# Patient Record
Sex: FEMALE | Race: WHITE | NOT HISPANIC OR LATINO | Employment: OTHER | URBAN - METROPOLITAN AREA
[De-identification: names, ages, dates, MRNs, and addresses within clinical notes are randomized per-mention and may not be internally consistent; named-entity substitution may affect disease eponyms.]

---

## 2017-06-19 ENCOUNTER — TELEPHONE (OUTPATIENT)
Dept: UROLOGY | Facility: CLINIC | Age: 77
End: 2017-06-19

## 2017-06-19 DIAGNOSIS — N20.0 KIDNEY STONE: Primary | ICD-10-CM

## 2017-06-19 NOTE — TELEPHONE ENCOUNTER
----- Message from Benita Griffin sent at 6/19/2017  8:47 AM CDT -----  Contact: ILENE Dove passed a kidney stone over the weekend. She is calling to find out if she needs an appointment or does the stone need to be brought in. States she will be leaving her home about 10:30 am. Please call [phone] (home)   thanks

## 2017-06-19 NOTE — TELEPHONE ENCOUNTER
Spoke to pt an she passed her kidney stone and was able to save it. She is going to bring it into the lab today for analysis.

## 2017-06-27 ENCOUNTER — LAB VISIT (OUTPATIENT)
Dept: LAB | Facility: HOSPITAL | Age: 77
End: 2017-06-27
Attending: UROLOGY
Payer: MEDICARE

## 2017-06-27 DIAGNOSIS — N20.0 KIDNEY STONE: ICD-10-CM

## 2017-06-27 PROCEDURE — 82365 CALCULUS SPECTROSCOPY: CPT

## 2017-07-03 LAB
ANNOTATION COMMENT IMP: NORMAL
COMPN STONE: NORMAL
SPECIMEN SOURCE: NORMAL
STONE ANALYSIS IR-IMP: NORMAL

## 2018-07-19 ENCOUNTER — OFFICE VISIT (OUTPATIENT)
Dept: UROLOGY | Facility: CLINIC | Age: 78
End: 2018-07-19
Payer: MEDICARE

## 2018-07-19 VITALS
DIASTOLIC BLOOD PRESSURE: 71 MMHG | WEIGHT: 155.13 LBS | HEIGHT: 61 IN | HEART RATE: 74 BPM | SYSTOLIC BLOOD PRESSURE: 138 MMHG | BODY MASS INDEX: 29.29 KG/M2

## 2018-07-19 DIAGNOSIS — N20.0 KIDNEY STONE: Primary | ICD-10-CM

## 2018-07-19 LAB
BILIRUB SERPL-MCNC: NORMAL MG/DL
BLOOD URINE, POC: NORMAL
COLOR, POC UA: YELLOW
GLUCOSE UR QL STRIP: NORMAL
KETONES UR QL STRIP: NORMAL
LEUKOCYTE ESTERASE URINE, POC: NORMAL
NITRITE, POC UA: NORMAL
PH, POC UA: 6.5
PROTEIN, POC: NORMAL
SPECIFIC GRAVITY, POC UA: 1.02
UROBILINOGEN, POC UA: NORMAL

## 2018-07-19 PROCEDURE — 99214 OFFICE O/P EST MOD 30 MIN: CPT | Mod: 25,S$GLB,, | Performed by: UROLOGY

## 2018-07-19 PROCEDURE — 99999 PR PBB SHADOW E&M-EST. PATIENT-LVL III: CPT | Mod: PBBFAC,,, | Performed by: UROLOGY

## 2018-07-19 PROCEDURE — 81002 URINALYSIS NONAUTO W/O SCOPE: CPT | Mod: S$GLB,,, | Performed by: UROLOGY

## 2018-07-19 RX ORDER — ROSUVASTATIN CALCIUM 5 MG/1
TABLET, COATED ORAL
COMMUNITY
Start: 2018-07-06 | End: 2019-09-10 | Stop reason: SDUPTHER

## 2018-07-19 NOTE — PROGRESS NOTES
UROLOGY [place]  7 19 18    Cc nephrolithiasis    Age 77 has a hx of nephrolithiasis, and has significant concern regarding this history.     Two years ago presented at [facility] with acute colic and a CT scan suggested that she might just have passed a stone.  At the current time, patient is asymptomatic and voiding without difficulty and denies pain. She would like a CT scan follow up to make sure she does not have worsening stone situation     PAST MEDICAL HISTORY:    SURGICAL:  Cholecystectomy,  section, cataract surgery.     MEDICAL:  CAD, essential hypertension, elevated cholesterol, obesity, osteoarthritis.     FAMILIAL:  Father and brother with nephrolithiasis.     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     ALLERGIES:  NKDA.    Current Outpatient Prescriptions on File Prior to Visit   Medication Sig Dispense Refill    aspirin 325 MG tablet Take 325 mg by mouth once.       fish,bora,flax oils-om3,6,9 #1 1,200 mg Cap Take by mouth. 3 capsules daily         REVIEW OF SYSTEMS  GENERAL:  No headaches or dizzy spells.   HEENT: vision preserved. Sinuses: No complaints.   CARDIOPULMONARY: No swelling of the legs; no chest pain. No shortness of breath, no wheezing.   GASTROINTESTINAL: No heartburn. Denies diarrhea; denies constipation, no blood or mucus in stools.   GENITOURINARY: Denies dysuria, bleeding or incontinence.   MUSCULOSKELETAL: some arthritic complaints such as pain or stiffness.   PSYCHIATRIC: No history of depression or anxiety.   ENDOCRINOLOGIC: No reports of sweating, cold or heat intolerance. No polyuria or polydipsia.   NEUROLOGICAL: No headache, dizziness, syncope, paralysis  LYMPHATICS: No enlarged nodes. No history of splenectomy.  ==       PHYSICAL EXAMINATION:  ABDOMEN:  Flat.  No masses.  CVAs negative.  No organomegaly or tenderness.     IMPRESSION:  Nephrolithiasis, with history of  spontaneous passage of stones.  Ordering a CT scan, wants it done at [facility]

## 2018-07-23 ENCOUNTER — TELEPHONE (OUTPATIENT)
Dept: UROLOGY | Facility: CLINIC | Age: 78
End: 2018-07-23

## 2018-07-23 NOTE — TELEPHONE ENCOUNTER
----- Message from Venus Randolph sent at 7/23/2018 11:41 AM CDT -----  Contact: [facility]  Lani is requesting an authorization from the patients insurance for the CT Scan that is scheduled for tomorrow 7/24.  Call Lani back at [phone].  Thanks

## 2018-07-24 ENCOUNTER — TELEPHONE (OUTPATIENT)
Dept: UROLOGY | Facility: CLINIC | Age: 78
End: 2018-07-24

## 2018-07-24 NOTE — TELEPHONE ENCOUNTER
----- Message from Jeovany Nolasco sent at 7/24/2018  2:35 PM CDT -----  Contact: self   Patient need CT results, please call back at [phone] (home)

## 2018-07-25 NOTE — TELEPHONE ENCOUNTER
Patient notified that CT Scan done @ [facility] shows right side nephrolithiasis without hydronephrosis.

## 2019-09-05 ENCOUNTER — TELEPHONE (OUTPATIENT)
Dept: UROLOGY | Facility: CLINIC | Age: 79
End: 2019-09-05

## 2019-09-05 DIAGNOSIS — N20.0 KIDNEY STONE: Primary | ICD-10-CM

## 2019-09-05 NOTE — TELEPHONE ENCOUNTER
----- Message from Yumiko Keller sent at 9/5/2019  8:08 AM CDT -----  Type: Needs Medical Advice    Who Called:  Patient  Best Call Back Number: [phone]  Additional Information: Patient stated she passed kidney stones last night (has them to bring in)requesting to speak with nurse concerning/please call back to advise.

## 2019-09-10 ENCOUNTER — HOSPITAL ENCOUNTER (OUTPATIENT)
Dept: RADIOLOGY | Facility: HOSPITAL | Age: 79
Discharge: HOME | End: 2019-09-10
Attending: UROLOGY
Payer: MEDICARE

## 2019-09-10 ENCOUNTER — OFFICE VISIT (OUTPATIENT)
Dept: UROLOGY | Facility: CLINIC | Age: 79
End: 2019-09-10
Payer: MEDICARE

## 2019-09-10 VITALS
WEIGHT: 162.69 LBS | HEART RATE: 77 BPM | DIASTOLIC BLOOD PRESSURE: 67 MMHG | BODY MASS INDEX: 30.71 KG/M2 | SYSTOLIC BLOOD PRESSURE: 123 MMHG | HEIGHT: 61 IN

## 2019-09-10 DIAGNOSIS — N23 RENAL COLIC ON LEFT SIDE: ICD-10-CM

## 2019-09-10 DIAGNOSIS — N20.0 KIDNEY STONE: Primary | ICD-10-CM

## 2019-09-10 LAB
BILIRUB SERPL-MCNC: NORMAL MG/DL
BLOOD URINE, POC: NORMAL
COLOR, POC UA: NORMAL
GLUCOSE UR QL STRIP: NORMAL
KETONES UR QL STRIP: NORMAL
LEUKOCYTE ESTERASE URINE, POC: NORMAL
NITRITE, POC UA: NORMAL
PH, POC UA: 5.5
PROTEIN, POC: NORMAL
SPECIFIC GRAVITY, POC UA: 1025
UROBILINOGEN, POC UA: NORMAL

## 2019-09-10 PROCEDURE — 1101F PR PT FALLS ASSESS DOC 0-1 FALLS W/OUT INJ PAST YR: ICD-10-PCS | Mod: CPTII,S$GLB,, | Performed by: UROLOGY

## 2019-09-10 PROCEDURE — 74176 CT RENAL STONE STUDY ABD PELVIS WO: ICD-10-PCS | Mod: 26,,, | Performed by: RADIOLOGY

## 2019-09-10 PROCEDURE — 1101F PT FALLS ASSESS-DOCD LE1/YR: CPT | Mod: CPTII,S$GLB,, | Performed by: UROLOGY

## 2019-09-10 PROCEDURE — 99999 PR PBB SHADOW E&M-EST. PATIENT-LVL III: CPT | Mod: PBBFAC,,, | Performed by: UROLOGY

## 2019-09-10 PROCEDURE — 81002 POCT URINE DIPSTICK WITHOUT MICROSCOPE: ICD-10-PCS | Mod: S$GLB,,, | Performed by: UROLOGY

## 2019-09-10 PROCEDURE — 99214 OFFICE O/P EST MOD 30 MIN: CPT | Mod: 25,S$GLB,, | Performed by: UROLOGY

## 2019-09-10 PROCEDURE — 81002 URINALYSIS NONAUTO W/O SCOPE: CPT | Mod: S$GLB,,, | Performed by: UROLOGY

## 2019-09-10 PROCEDURE — 74176 CT ABD & PELVIS W/O CONTRAST: CPT | Mod: TC,PO

## 2019-09-10 PROCEDURE — 99999 PR PBB SHADOW E&M-EST. PATIENT-LVL III: ICD-10-PCS | Mod: PBBFAC,,, | Performed by: UROLOGY

## 2019-09-10 PROCEDURE — 74176 CT ABD & PELVIS W/O CONTRAST: CPT | Mod: 26,,, | Performed by: RADIOLOGY

## 2019-09-10 PROCEDURE — 99214 PR OFFICE/OUTPT VISIT, EST, LEVL IV, 30-39 MIN: ICD-10-PCS | Mod: 25,S$GLB,, | Performed by: UROLOGY

## 2019-09-10 RX ORDER — ROSUVASTATIN CALCIUM 5 MG/1
TABLET, COATED ORAL
COMMUNITY
End: 2021-05-24 | Stop reason: SDUPTHER

## 2019-09-10 RX ORDER — ATORVASTATIN CALCIUM 20 MG/1
20 TABLET, FILM COATED ORAL
COMMUNITY
End: 2021-05-24

## 2019-09-10 NOTE — PROGRESS NOTES
UROLOGY [place]  9 10 19    Cc nephrolithiasis    Age 79, this past week she presented acute L flank pain and nausea, and ended up passing two small stones (2-3 mm each). She brings the stones to the interview. She says she has had 4 stones in the past, and has passed them all.     She would like to have a CT scan to see where she stands in her kidney stone disease and would like to be proactive about her stones.     Feeling well now, and voiding well.        PAST MEDICAL HISTORY:     SURGICAL:  Cholecystectomy,  section, cataract surgery.     MEDICAL:  CAD, essential hypertension, elevated cholesterol, obesity, osteoarthritis. nephrolithiasis     FAMILIAL:  Father and brother with nephrolithiasis.     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     ALLERGIES:  NKDA.            Current Outpatient Prescriptions on File Prior to Visit   Medication Sig Dispense Refill    aspirin 325 MG tablet Take 325 mg by mouth once.         fish,bora,flax oils-om3,6,9 #1 1,200 mg Cap Take by mouth. 3 capsules daily             REVIEW OF SYSTEMS  GENERAL:  No headaches or dizzy spells.   HEENT: vision preserved. Sinuses: No complaints.   CARDIOPULMONARY: No swelling of the legs; no chest pain. No shortness of breath.   GASTROINTESTINAL: No heartburn. Denies diarrhea; denies constipation, no blood or mucus in stools.   GENITOURINARY: passing kidney stones. Denies dysuria, bleeding.   MUSCULOSKELETAL: some arthritic complaints such as pain or stiffness.   PSYCHIATRIC: No history of depression or anxiety.   ENDOCRINOLOGIC: No reports of sweating, cold or heat intolerance. No polyuria or polydipsia.   NEUROLOGICAL: No headache, dizziness, syncope, paralysis  LYMPHATICS: No enlarged nodes. No history of splenectomy.  ==        PHYSICAL EXAMINATION:    Pt alert, oriented, no distress  HEENT: wnl  Neck: supple, no JVD, no lymphadenopathy  Chest: CV  NSR  Lungs: normal chest expansion  Abdomen flat, nontender, no organomegaly, no masses.  No hernias  Extremities: no edema, peripheral pulses nl  Neuro: preserved      IMPRESSION:    Nephrolithiasis, with history of spontaneous passage of stones x 6.  Ordering a CT stone protocol

## 2019-09-17 ENCOUNTER — TELEPHONE (OUTPATIENT)
Dept: UROLOGY | Facility: CLINIC | Age: 79
End: 2019-09-17

## 2019-09-17 NOTE — TELEPHONE ENCOUNTER
Discussed findings and recommendations with the patient. She stated whatever test the doctor recommends, she would take. Please order whatever test you would like for her and I will call he rto schedule.

## 2019-09-20 ENCOUNTER — TELEPHONE (OUTPATIENT)
Dept: UROLOGY | Facility: CLINIC | Age: 79
End: 2019-09-20

## 2019-09-20 NOTE — TELEPHONE ENCOUNTER
----- Message from Irina Dye sent at 9/20/2019 11:56 AM CDT -----  Contact: patient  Type: Needs Medical Advice    Who Called:  patient  Best Call Back Number: [phone]  Additional Information: patient calling to schedule other tests that were recommended for her to take. Please give call back

## 2019-09-25 ENCOUNTER — TELEPHONE (OUTPATIENT)
Dept: UROLOGY | Facility: CLINIC | Age: 79
End: 2019-09-25

## 2019-09-25 DIAGNOSIS — N94.89 ADNEXAL MASS: ICD-10-CM

## 2019-09-25 NOTE — TELEPHONE ENCOUNTER
----- Message from Juan Daniel Hughes sent at 9/25/2019  2:39 PM CDT -----  Type: Needs Medical Advice    Who Called:  Patient    Best Call Back Number: [phone]  Additional Information: Patient states that she would like a callback regarding a contrast test on her kidneys

## 2019-09-26 ENCOUNTER — LAB VISIT (OUTPATIENT)
Dept: LAB | Facility: HOSPITAL | Age: 79
End: 2019-09-26
Attending: UROLOGY
Payer: MEDICARE

## 2019-09-26 DIAGNOSIS — N94.89 ADNEXAL MASS: ICD-10-CM

## 2019-09-26 LAB
CREAT SERPL-MCNC: 0.9 MG/DL (ref 0.5–1.4)
EST. GFR  (AFRICAN AMERICAN): >60 ML/MIN/1.73 M^2
EST. GFR  (NON AFRICAN AMERICAN): >60 ML/MIN/1.73 M^2

## 2019-09-26 PROCEDURE — 82565 ASSAY OF CREATININE: CPT

## 2019-09-26 PROCEDURE — 36415 COLL VENOUS BLD VENIPUNCTURE: CPT | Mod: PO

## 2019-10-03 ENCOUNTER — HOSPITAL ENCOUNTER (OUTPATIENT)
Dept: RADIOLOGY | Facility: HOSPITAL | Age: 79
Discharge: HOME | End: 2019-10-03
Attending: UROLOGY
Payer: MEDICARE

## 2019-10-03 DIAGNOSIS — N94.89 ADNEXAL MASS: ICD-10-CM

## 2019-10-03 PROCEDURE — 74170 CT ABDOMEN W WO CONTRAST: ICD-10-PCS | Mod: 26,,, | Performed by: RADIOLOGY

## 2019-10-03 PROCEDURE — 74170 CT ABD WO CNTRST FLWD CNTRST: CPT | Mod: TC,PO

## 2019-10-03 PROCEDURE — 74170 CT ABD WO CNTRST FLWD CNTRST: CPT | Mod: 26,,, | Performed by: RADIOLOGY

## 2019-10-03 PROCEDURE — 25500020 PHARM REV CODE 255: Mod: PO | Performed by: UROLOGY

## 2019-10-03 RX ADMIN — IOHEXOL 75 ML: 350 INJECTION, SOLUTION INTRAVENOUS at 08:10

## 2019-10-04 ENCOUNTER — TELEPHONE (OUTPATIENT)
Dept: UROLOGY | Facility: CLINIC | Age: 79
End: 2019-10-04

## 2019-10-04 NOTE — TELEPHONE ENCOUNTER
----- Message from Pedro Huertas sent at 10/4/2019 12:07 PM CDT -----  Contact: Patient  Type:  Patient Returning Call    Who Called:  Patient  Who Left Message for Patient:  unknown  Does the patient know what this is regarding?:  Test results  Best Call Back Number:  [phone]  Additional Information:  NA

## 2019-10-09 ENCOUNTER — TELEPHONE (OUTPATIENT)
Dept: UROLOGY | Facility: CLINIC | Age: 79
End: 2019-10-09

## 2019-10-09 NOTE — TELEPHONE ENCOUNTER
----- Message from Jazzy Conner sent at 10/9/2019  8:04 AM CDT -----  Contact: self  Type:  Test Results    Who Called:  Patient   Name of Test (Lab/Mammo/Etc):  CT  Date of Test:  10/03  Ordering Provider:  Vijay  Where the test was performed:  [address] [facility] [address] [place]  Best Call Back Number:  [phone] (home) patient can be reach this afternoon per patient     Additional Information:

## 2019-10-10 NOTE — TELEPHONE ENCOUNTER
She has a small non obstructing kidney stone. No cause for pain and no future treatment required.

## 2019-10-17 LAB — BMD RECOMMENDATION EXT: NORMAL

## 2021-01-10 ENCOUNTER — IMMUNIZATION (OUTPATIENT)
Dept: FAMILY MEDICINE | Facility: CLINIC | Age: 81
End: 2021-01-10
Payer: MEDICARE

## 2021-01-10 DIAGNOSIS — Z23 NEED FOR VACCINATION: ICD-10-CM

## 2021-01-10 PROCEDURE — 91300 COVID-19, MRNA, LNP-S, PF, 30 MCG/0.3 ML DOSE VACCINE: CPT | Mod: PBBFAC | Performed by: FAMILY MEDICINE

## 2021-01-31 ENCOUNTER — IMMUNIZATION (OUTPATIENT)
Dept: FAMILY MEDICINE | Facility: CLINIC | Age: 81
End: 2021-01-31
Payer: MEDICARE

## 2021-01-31 DIAGNOSIS — Z23 NEED FOR VACCINATION: Primary | ICD-10-CM

## 2021-01-31 PROCEDURE — 0002A COVID-19, MRNA, LNP-S, PF, 30 MCG/0.3 ML DOSE VACCINE: CPT | Mod: PBBFAC | Performed by: INTERNAL MEDICINE

## 2021-01-31 PROCEDURE — 91300 COVID-19, MRNA, LNP-S, PF, 30 MCG/0.3 ML DOSE VACCINE: CPT | Mod: PBBFAC | Performed by: INTERNAL MEDICINE

## 2021-03-02 DIAGNOSIS — Z96.651 STATUS POST TOTAL KNEE REPLACEMENT, RIGHT: Primary | ICD-10-CM

## 2021-03-05 ENCOUNTER — CLINICAL SUPPORT (OUTPATIENT)
Dept: REHABILITATION | Facility: HOSPITAL | Age: 81
End: 2021-03-05
Payer: MEDICARE

## 2021-03-05 DIAGNOSIS — Z96.651 STATUS POST TOTAL KNEE REPLACEMENT, RIGHT: ICD-10-CM

## 2021-03-05 DIAGNOSIS — M25.661 DECREASED ROM OF RIGHT KNEE: ICD-10-CM

## 2021-03-05 PROCEDURE — 97161 PT EVAL LOW COMPLEX 20 MIN: CPT | Mod: PO

## 2021-03-05 PROCEDURE — 97110 THERAPEUTIC EXERCISES: CPT | Mod: PO

## 2021-03-10 ENCOUNTER — CLINICAL SUPPORT (OUTPATIENT)
Dept: REHABILITATION | Facility: HOSPITAL | Age: 81
End: 2021-03-10
Payer: MEDICARE

## 2021-03-10 DIAGNOSIS — M25.661 DECREASED ROM OF RIGHT KNEE: ICD-10-CM

## 2021-03-10 PROCEDURE — 97110 THERAPEUTIC EXERCISES: CPT | Mod: PO,CQ

## 2021-03-10 PROCEDURE — 97140 MANUAL THERAPY 1/> REGIONS: CPT | Mod: PO,CQ

## 2021-03-16 ENCOUNTER — CLINICAL SUPPORT (OUTPATIENT)
Dept: REHABILITATION | Facility: HOSPITAL | Age: 81
End: 2021-03-16
Payer: MEDICARE

## 2021-03-16 DIAGNOSIS — M25.661 DECREASED ROM OF RIGHT KNEE: ICD-10-CM

## 2021-03-16 PROCEDURE — 97110 THERAPEUTIC EXERCISES: CPT | Mod: PO,CQ

## 2021-03-19 ENCOUNTER — CLINICAL SUPPORT (OUTPATIENT)
Dept: REHABILITATION | Facility: HOSPITAL | Age: 81
End: 2021-03-19
Payer: MEDICARE

## 2021-03-19 DIAGNOSIS — M25.661 DECREASED ROM OF RIGHT KNEE: ICD-10-CM

## 2021-03-19 PROCEDURE — 97110 THERAPEUTIC EXERCISES: CPT | Mod: PO,CQ

## 2021-03-23 ENCOUNTER — CLINICAL SUPPORT (OUTPATIENT)
Dept: REHABILITATION | Facility: HOSPITAL | Age: 81
End: 2021-03-23
Payer: MEDICARE

## 2021-03-23 DIAGNOSIS — M25.661 DECREASED ROM OF RIGHT KNEE: ICD-10-CM

## 2021-03-23 PROCEDURE — 97110 THERAPEUTIC EXERCISES: CPT | Mod: PO

## 2021-03-26 ENCOUNTER — CLINICAL SUPPORT (OUTPATIENT)
Dept: REHABILITATION | Facility: HOSPITAL | Age: 81
End: 2021-03-26
Payer: MEDICARE

## 2021-03-26 DIAGNOSIS — M25.661 DECREASED ROM OF RIGHT KNEE: ICD-10-CM

## 2021-03-26 PROCEDURE — 97110 THERAPEUTIC EXERCISES: CPT | Mod: PO

## 2021-03-30 ENCOUNTER — CLINICAL SUPPORT (OUTPATIENT)
Dept: REHABILITATION | Facility: HOSPITAL | Age: 81
End: 2021-03-30
Payer: MEDICARE

## 2021-03-30 DIAGNOSIS — M25.661 DECREASED ROM OF RIGHT KNEE: ICD-10-CM

## 2021-03-30 PROCEDURE — 97110 THERAPEUTIC EXERCISES: CPT | Mod: PO

## 2021-04-01 ENCOUNTER — CLINICAL SUPPORT (OUTPATIENT)
Dept: REHABILITATION | Facility: HOSPITAL | Age: 81
End: 2021-04-01
Payer: MEDICARE

## 2021-04-01 DIAGNOSIS — M25.661 DECREASED ROM OF RIGHT KNEE: ICD-10-CM

## 2021-04-01 PROCEDURE — 97110 THERAPEUTIC EXERCISES: CPT | Mod: PO,CQ

## 2021-04-06 ENCOUNTER — CLINICAL SUPPORT (OUTPATIENT)
Dept: REHABILITATION | Facility: HOSPITAL | Age: 81
End: 2021-04-06
Payer: MEDICARE

## 2021-04-06 DIAGNOSIS — M25.661 DECREASED ROM OF RIGHT KNEE: ICD-10-CM

## 2021-04-06 PROCEDURE — 97110 THERAPEUTIC EXERCISES: CPT | Mod: PO,CQ

## 2021-04-09 ENCOUNTER — CLINICAL SUPPORT (OUTPATIENT)
Dept: REHABILITATION | Facility: HOSPITAL | Age: 81
End: 2021-04-09
Payer: MEDICARE

## 2021-04-09 DIAGNOSIS — M25.661 DECREASED ROM OF RIGHT KNEE: ICD-10-CM

## 2021-04-09 PROCEDURE — 97110 THERAPEUTIC EXERCISES: CPT | Mod: PO

## 2021-04-13 ENCOUNTER — CLINICAL SUPPORT (OUTPATIENT)
Dept: REHABILITATION | Facility: HOSPITAL | Age: 81
End: 2021-04-13
Payer: MEDICARE

## 2021-04-13 DIAGNOSIS — M25.661 DECREASED ROM OF RIGHT KNEE: ICD-10-CM

## 2021-04-13 PROCEDURE — 97110 THERAPEUTIC EXERCISES: CPT | Mod: PO

## 2021-04-14 DIAGNOSIS — Z96.651 STATUS POST TOTAL RIGHT KNEE REPLACEMENT: Primary | ICD-10-CM

## 2021-04-16 ENCOUNTER — CLINICAL SUPPORT (OUTPATIENT)
Dept: REHABILITATION | Facility: HOSPITAL | Age: 81
End: 2021-04-16
Payer: MEDICARE

## 2021-04-16 DIAGNOSIS — M25.661 DECREASED ROM OF RIGHT KNEE: ICD-10-CM

## 2021-04-16 PROCEDURE — 97110 THERAPEUTIC EXERCISES: CPT | Mod: PO

## 2021-05-05 ENCOUNTER — TELEPHONE (OUTPATIENT)
Dept: OBSTETRICS AND GYNECOLOGY | Facility: CLINIC | Age: 81
End: 2021-05-05

## 2021-05-05 ENCOUNTER — TELEPHONE (OUTPATIENT)
Dept: FAMILY MEDICINE | Facility: CLINIC | Age: 81
End: 2021-05-05

## 2021-05-24 ENCOUNTER — OFFICE VISIT (OUTPATIENT)
Dept: FAMILY MEDICINE | Facility: CLINIC | Age: 81
End: 2021-05-24
Payer: MEDICARE

## 2021-05-24 VITALS
HEART RATE: 70 BPM | BODY MASS INDEX: 32.48 KG/M2 | HEIGHT: 62 IN | SYSTOLIC BLOOD PRESSURE: 138 MMHG | DIASTOLIC BLOOD PRESSURE: 80 MMHG | WEIGHT: 176.5 LBS

## 2021-05-24 DIAGNOSIS — N28.1 KIDNEY CYSTS: ICD-10-CM

## 2021-05-24 DIAGNOSIS — Z00.00 MEDICARE ANNUAL WELLNESS VISIT, SUBSEQUENT: ICD-10-CM

## 2021-05-24 DIAGNOSIS — I70.0 AORTIC ATHEROSCLEROSIS: ICD-10-CM

## 2021-05-24 DIAGNOSIS — M51.36 DDD (DEGENERATIVE DISC DISEASE), LUMBAR: ICD-10-CM

## 2021-05-24 DIAGNOSIS — E78.2 MIXED HYPERLIPIDEMIA: Primary | ICD-10-CM

## 2021-05-24 DIAGNOSIS — Z96.651 STATUS POST RIGHT KNEE REPLACEMENT: ICD-10-CM

## 2021-05-24 DIAGNOSIS — D35.02 ADRENAL ADENOMA, LEFT: ICD-10-CM

## 2021-05-24 DIAGNOSIS — R73.02 GLUCOSE INTOLERANCE (IMPAIRED GLUCOSE TOLERANCE): ICD-10-CM

## 2021-05-24 PROBLEM — M51.369 DDD (DEGENERATIVE DISC DISEASE), LUMBAR: Status: ACTIVE | Noted: 2021-05-24

## 2021-05-24 PROCEDURE — 1101F PT FALLS ASSESS-DOCD LE1/YR: CPT | Mod: CPTII,S$GLB,, | Performed by: INTERNAL MEDICINE

## 2021-05-24 PROCEDURE — 99499 UNLISTED E&M SERVICE: CPT | Mod: HCNC,S$GLB,, | Performed by: INTERNAL MEDICINE

## 2021-05-24 PROCEDURE — 99499 RISK ADDL DX/OHS AUDIT: ICD-10-PCS | Mod: HCNC,S$GLB,, | Performed by: INTERNAL MEDICINE

## 2021-05-24 PROCEDURE — 99999 PR PBB SHADOW E&M-EST. PATIENT-LVL III: CPT | Mod: PBBFAC,,, | Performed by: INTERNAL MEDICINE

## 2021-05-24 PROCEDURE — 3288F PR FALLS RISK ASSESSMENT DOCUMENTED: ICD-10-PCS | Mod: CPTII,S$GLB,, | Performed by: INTERNAL MEDICINE

## 2021-05-24 PROCEDURE — 3288F FALL RISK ASSESSMENT DOCD: CPT | Mod: CPTII,S$GLB,, | Performed by: INTERNAL MEDICINE

## 2021-05-24 PROCEDURE — 1101F PR PT FALLS ASSESS DOC 0-1 FALLS W/OUT INJ PAST YR: ICD-10-PCS | Mod: CPTII,S$GLB,, | Performed by: INTERNAL MEDICINE

## 2021-05-24 PROCEDURE — 99999 PR PBB SHADOW E&M-EST. PATIENT-LVL III: ICD-10-PCS | Mod: PBBFAC,,, | Performed by: INTERNAL MEDICINE

## 2021-05-24 PROCEDURE — 1159F PR MEDICATION LIST DOCUMENTED IN MEDICAL RECORD: ICD-10-PCS | Mod: S$GLB,,, | Performed by: INTERNAL MEDICINE

## 2021-05-24 PROCEDURE — 99214 PR OFFICE/OUTPT VISIT, EST, LEVL IV, 30-39 MIN: ICD-10-PCS | Mod: S$GLB,,, | Performed by: INTERNAL MEDICINE

## 2021-05-24 PROCEDURE — 1125F PR PAIN SEVERITY QUANTIFIED, PAIN PRESENT: ICD-10-PCS | Mod: S$GLB,,, | Performed by: INTERNAL MEDICINE

## 2021-05-24 PROCEDURE — 99214 OFFICE O/P EST MOD 30 MIN: CPT | Mod: S$GLB,,, | Performed by: INTERNAL MEDICINE

## 2021-05-24 PROCEDURE — 1159F MED LIST DOCD IN RCRD: CPT | Mod: S$GLB,,, | Performed by: INTERNAL MEDICINE

## 2021-05-24 PROCEDURE — 1125F AMNT PAIN NOTED PAIN PRSNT: CPT | Mod: S$GLB,,, | Performed by: INTERNAL MEDICINE

## 2021-05-24 RX ORDER — ACETAMINOPHEN 160 MG/5ML
200 SUSPENSION, ORAL (FINAL DOSE FORM) ORAL DAILY
COMMUNITY

## 2021-05-24 RX ORDER — ROSUVASTATIN CALCIUM 5 MG/1
5 TABLET, COATED ORAL DAILY
Qty: 90 TABLET | Refills: 3 | Status: SHIPPED | OUTPATIENT
Start: 2021-05-24 | End: 2021-08-11 | Stop reason: SDUPTHER

## 2021-08-11 RX ORDER — ROSUVASTATIN CALCIUM 5 MG/1
5 TABLET, COATED ORAL DAILY
Qty: 90 TABLET | Refills: 3 | Status: SHIPPED | OUTPATIENT
Start: 2021-08-11 | End: 2021-12-01 | Stop reason: SDUPTHER

## 2021-10-01 ENCOUNTER — IMMUNIZATION (OUTPATIENT)
Dept: FAMILY MEDICINE | Facility: CLINIC | Age: 81
End: 2021-10-01
Payer: MEDICARE

## 2021-10-01 DIAGNOSIS — Z23 NEED FOR VACCINATION: Primary | ICD-10-CM

## 2021-10-01 PROCEDURE — 91300 COVID-19, MRNA, LNP-S, PF, 30 MCG/0.3 ML DOSE VACCINE: CPT | Mod: HCNC,PBBFAC | Performed by: INTERNAL MEDICINE

## 2021-10-01 PROCEDURE — 0003A COVID-19, MRNA, LNP-S, PF, 30 MCG/0.3 ML DOSE VACCINE: CPT | Mod: HCNC,PBBFAC | Performed by: INTERNAL MEDICINE

## 2021-10-04 ENCOUNTER — TELEPHONE (OUTPATIENT)
Dept: FAMILY MEDICINE | Facility: CLINIC | Age: 81
End: 2021-10-04

## 2021-10-04 DIAGNOSIS — Z12.31 ENCOUNTER FOR SCREENING MAMMOGRAM FOR BREAST CANCER: Primary | ICD-10-CM

## 2021-11-23 LAB
ALBUMIN SERPL-MCNC: 4.3 G/DL (ref 3.6–5.1)
ALBUMIN/GLOB SERPL: 1.8 (CALC) (ref 1–2.5)
ALP SERPL-CCNC: 62 U/L (ref 37–153)
ALT SERPL-CCNC: 50 U/L (ref 6–29)
AST SERPL-CCNC: 31 U/L (ref 10–35)
BILIRUB SERPL-MCNC: 0.7 MG/DL (ref 0.2–1.2)
BUN SERPL-MCNC: 17 MG/DL (ref 7–25)
BUN/CREAT SERPL: 18 (CALC) (ref 6–22)
CALCIUM SERPL-MCNC: 9.6 MG/DL (ref 8.6–10.4)
CHLORIDE SERPL-SCNC: 106 MMOL/L (ref 98–110)
CHOLEST SERPL-MCNC: 182 MG/DL
CHOLEST/HDLC SERPL: 2.8 (CALC)
CO2 SERPL-SCNC: 25 MMOL/L (ref 20–32)
CREAT SERPL-MCNC: 0.92 MG/DL (ref 0.6–0.88)
GLOBULIN SER CALC-MCNC: 2.4 G/DL (CALC) (ref 1.9–3.7)
GLUCOSE SERPL-MCNC: 120 MG/DL (ref 65–99)
HBA1C MFR BLD: 7 % OF TOTAL HGB
HDLC SERPL-MCNC: 65 MG/DL
LDLC SERPL CALC-MCNC: 95 MG/DL (CALC)
NONHDLC SERPL-MCNC: 117 MG/DL (CALC)
POTASSIUM SERPL-SCNC: 4.5 MMOL/L (ref 3.5–5.3)
PROT SERPL-MCNC: 6.7 G/DL (ref 6.1–8.1)
SODIUM SERPL-SCNC: 141 MMOL/L (ref 135–146)
TRIGL SERPL-MCNC: 122 MG/DL

## 2021-12-01 ENCOUNTER — OFFICE VISIT (OUTPATIENT)
Dept: FAMILY MEDICINE | Facility: CLINIC | Age: 81
End: 2021-12-01
Payer: MEDICARE

## 2021-12-01 VITALS
WEIGHT: 192 LBS | HEIGHT: 62 IN | BODY MASS INDEX: 35.33 KG/M2 | DIASTOLIC BLOOD PRESSURE: 72 MMHG | SYSTOLIC BLOOD PRESSURE: 120 MMHG

## 2021-12-01 DIAGNOSIS — Z00.00 MEDICARE ANNUAL WELLNESS VISIT, SUBSEQUENT: Primary | ICD-10-CM

## 2021-12-01 DIAGNOSIS — R74.8 ELEVATED LIVER ENZYMES: ICD-10-CM

## 2021-12-01 DIAGNOSIS — E11.9 TYPE 2 DIABETES, DIET CONTROLLED: ICD-10-CM

## 2021-12-01 DIAGNOSIS — E55.9 VITAMIN D DEFICIENCY: ICD-10-CM

## 2021-12-01 DIAGNOSIS — E78.2 MIXED HYPERLIPIDEMIA: ICD-10-CM

## 2021-12-01 DIAGNOSIS — R53.83 FATIGUE, UNSPECIFIED TYPE: ICD-10-CM

## 2021-12-01 DIAGNOSIS — R79.89 ABNORMAL CBC: ICD-10-CM

## 2021-12-01 DIAGNOSIS — I70.0 AORTIC ATHEROSCLEROSIS: ICD-10-CM

## 2021-12-01 DIAGNOSIS — E66.01 SEVERE OBESITY (BMI 35.0-39.9) WITH COMORBIDITY: ICD-10-CM

## 2021-12-01 DIAGNOSIS — M79.651 PAIN OF RIGHT THIGH: ICD-10-CM

## 2021-12-01 PROCEDURE — G0009 PNEUMOCOCCAL POLYSACCHARIDE VACCINE 23-VALENT =>2YO SQ IM: ICD-10-PCS | Mod: HCNC,S$GLB,, | Performed by: INTERNAL MEDICINE

## 2021-12-01 PROCEDURE — 99397 PR PREVENTIVE VISIT,EST,65 & OVER: ICD-10-PCS | Mod: 25,HCNC,S$GLB, | Performed by: INTERNAL MEDICINE

## 2021-12-01 PROCEDURE — 90732 PNEUMOCOCCAL POLYSACCHARIDE VACCINE 23-VALENT =>2YO SQ IM: ICD-10-PCS | Mod: HCNC,S$GLB,, | Performed by: INTERNAL MEDICINE

## 2021-12-01 PROCEDURE — G0009 ADMIN PNEUMOCOCCAL VACCINE: HCPCS | Mod: HCNC,S$GLB,, | Performed by: INTERNAL MEDICINE

## 2021-12-01 PROCEDURE — 90732 PPSV23 VACC 2 YRS+ SUBQ/IM: CPT | Mod: HCNC,S$GLB,, | Performed by: INTERNAL MEDICINE

## 2021-12-01 PROCEDURE — G0439 PR MEDICARE ANNUAL WELLNESS SUBSEQUENT VISIT: ICD-10-PCS | Mod: HCNC,S$GLB,, | Performed by: INTERNAL MEDICINE

## 2021-12-01 PROCEDURE — G0439 PPPS, SUBSEQ VISIT: HCPCS | Mod: HCNC,S$GLB,, | Performed by: INTERNAL MEDICINE

## 2021-12-01 PROCEDURE — 99397 PER PM REEVAL EST PAT 65+ YR: CPT | Mod: 25,HCNC,S$GLB, | Performed by: INTERNAL MEDICINE

## 2021-12-01 PROCEDURE — 99499 RISK ADDL DX/OHS AUDIT: ICD-10-PCS | Mod: S$GLB,,, | Performed by: INTERNAL MEDICINE

## 2021-12-01 PROCEDURE — 99999 PR PBB SHADOW E&M-EST. PATIENT-LVL III: ICD-10-PCS | Mod: PBBFAC,HCNC,, | Performed by: INTERNAL MEDICINE

## 2021-12-01 PROCEDURE — 99999 PR PBB SHADOW E&M-EST. PATIENT-LVL III: CPT | Mod: PBBFAC,HCNC,, | Performed by: INTERNAL MEDICINE

## 2021-12-01 PROCEDURE — 99499 UNLISTED E&M SERVICE: CPT | Mod: S$GLB,,, | Performed by: INTERNAL MEDICINE

## 2021-12-01 RX ORDER — ROSUVASTATIN CALCIUM 5 MG/1
5 TABLET, COATED ORAL DAILY
Qty: 90 TABLET | Refills: 3 | Status: SHIPPED | OUTPATIENT
Start: 2021-12-01 | End: 2022-06-08 | Stop reason: SDUPTHER

## 2022-04-13 ENCOUNTER — IMMUNIZATION (OUTPATIENT)
Dept: FAMILY MEDICINE | Facility: CLINIC | Age: 82
End: 2022-04-13
Payer: MEDICARE

## 2022-04-13 DIAGNOSIS — Z23 NEED FOR VACCINATION: Primary | ICD-10-CM

## 2022-04-13 PROCEDURE — 0054A COVID-19, MRNA, LNP-S, PF, 30 MCG/0.3 ML DOSE VACCINE (PFIZER): CPT | Mod: PBBFAC | Performed by: FAMILY MEDICINE

## 2022-05-26 LAB
25(OH)D3 SERPL-MCNC: 51 NG/ML (ref 30–100)
ALBUMIN SERPL-MCNC: 4.4 G/DL (ref 3.6–5.1)
ALBUMIN/GLOB SERPL: 1.6 (CALC) (ref 1–2.5)
ALP SERPL-CCNC: 57 U/L (ref 37–153)
ALT SERPL-CCNC: 110 U/L (ref 6–29)
AST SERPL-CCNC: 69 U/L (ref 10–35)
BASOPHILS # BLD AUTO: 77 CELLS/UL (ref 0–200)
BASOPHILS NFR BLD AUTO: 1.3 %
BILIRUB SERPL-MCNC: 0.6 MG/DL (ref 0.2–1.2)
BUN SERPL-MCNC: 20 MG/DL (ref 7–25)
BUN/CREAT SERPL: ABNORMAL (CALC) (ref 6–22)
CALCIUM SERPL-MCNC: 9.9 MG/DL (ref 8.6–10.4)
CHLORIDE SERPL-SCNC: 104 MMOL/L (ref 98–110)
CHOLEST SERPL-MCNC: 193 MG/DL
CHOLEST/HDLC SERPL: 2.9 (CALC)
CO2 SERPL-SCNC: 27 MMOL/L (ref 20–32)
CREAT SERPL-MCNC: 0.87 MG/DL (ref 0.6–0.88)
EOSINOPHIL # BLD AUTO: 130 CELLS/UL (ref 15–500)
EOSINOPHIL NFR BLD AUTO: 2.2 %
ERYTHROCYTE [DISTWIDTH] IN BLOOD BY AUTOMATED COUNT: 12.6 % (ref 11–15)
GLOBULIN SER CALC-MCNC: 2.7 G/DL (CALC) (ref 1.9–3.7)
GLUCOSE SERPL-MCNC: 156 MG/DL (ref 65–99)
HBA1C MFR BLD: 7.5 % OF TOTAL HGB
HCT VFR BLD AUTO: 41.8 % (ref 35–45)
HDLC SERPL-MCNC: 67 MG/DL
HGB BLD-MCNC: 13.5 G/DL (ref 11.7–15.5)
LDLC SERPL CALC-MCNC: 105 MG/DL (CALC)
LEFT EYE DM RETINOPATHY: NEGATIVE
LYMPHOCYTES # BLD AUTO: 2484 CELLS/UL (ref 850–3900)
LYMPHOCYTES NFR BLD AUTO: 42.1 %
MCH RBC QN AUTO: 29 PG (ref 27–33)
MCHC RBC AUTO-ENTMCNC: 32.3 G/DL (ref 32–36)
MCV RBC AUTO: 89.7 FL (ref 80–100)
MONOCYTES # BLD AUTO: 478 CELLS/UL (ref 200–950)
MONOCYTES NFR BLD AUTO: 8.1 %
NEUTROPHILS # BLD AUTO: 2732 CELLS/UL (ref 1500–7800)
NEUTROPHILS NFR BLD AUTO: 46.3 %
NONHDLC SERPL-MCNC: 126 MG/DL (CALC)
PLATELET # BLD AUTO: 265 THOUSAND/UL (ref 140–400)
PMV BLD REES-ECKER: 10.8 FL (ref 7.5–12.5)
POTASSIUM SERPL-SCNC: 4.8 MMOL/L (ref 3.5–5.3)
PROT SERPL-MCNC: 7.1 G/DL (ref 6.1–8.1)
RBC # BLD AUTO: 4.66 MILLION/UL (ref 3.8–5.1)
RIGHT EYE DM RETINOPATHY: NEGATIVE
SODIUM SERPL-SCNC: 139 MMOL/L (ref 135–146)
TRIGL SERPL-MCNC: 117 MG/DL
TSH SERPL-ACNC: 1.66 MIU/L (ref 0.4–4.5)
WBC # BLD AUTO: 5.9 THOUSAND/UL (ref 3.8–10.8)

## 2022-05-31 ENCOUNTER — TELEPHONE (OUTPATIENT)
Dept: FAMILY MEDICINE | Facility: CLINIC | Age: 82
End: 2022-05-31
Payer: MEDICARE

## 2022-05-31 DIAGNOSIS — U07.1 COVID-19: Primary | ICD-10-CM

## 2022-05-31 NOTE — TELEPHONE ENCOUNTER
Paxlovid sent. Needs to stop rosuvastatin for now, can restart this 1 week after completing paxlovid.

## 2022-05-31 NOTE — TELEPHONE ENCOUNTER
Does not have portal access, virtual is not an option. Does not have a smart phone    Home test is positive.Confirmed by second home test.     Symptoms began yesterday.     Sore throat, sneezing, relatively mild.     She is not worried about the symptoms she is experiencing, but more so the organ damage from the virus and what that may cause long term.   She is interested in oral medication or infusion if she qualifies.     PCP is out of office.

## 2022-05-31 NOTE — TELEPHONE ENCOUNTER
----- Message from Bharti Brunson sent at 5/31/2022  2:50 PM CDT -----  Who Called: Patient    What is the reqeust in detail: Requesting update on her previous inquiry. Patient tested positive for covid and would like to be placed on the oral medication. Patient is also scheduled for her Thursday appointment and would like to know if she should cancel. Please advise.     Can the clinic reply by [facility]? No    Best Call Back Number: [phone]    Additional Information: This is patient second inquiry and would like to know what to do next and how best she can get medication sent to her pharmacy listed below:     PHARMACY: [facility] - [address] [facility] [address]

## 2022-05-31 NOTE — TELEPHONE ENCOUNTER
----- Message from Satish Rodriguez sent at 5/31/2022 10:58 AM CDT -----  Contact: pt  Type: Needs Medical Advice    Who Called: pt    Best Call Back Number: [phone]     Requesting a call back regarding - Pt. Is asking for a call back she has covid and needs treatment please call ASAP       Please Advise- Thank you

## 2022-06-01 ENCOUNTER — TELEPHONE (OUTPATIENT)
Dept: FAMILY MEDICINE | Facility: CLINIC | Age: 82
End: 2022-06-01
Payer: MEDICARE

## 2022-06-01 NOTE — TELEPHONE ENCOUNTER
----- Message from Marilyn Douglass sent at 6/1/2022 11:51 AM CDT -----  Type:  Sooner Appointment Request    Caller is requesting a sooner appointment.  Caller declined first available appointment listed below.  Caller will not accept being placed on the waitlist and is requesting a message be sent to doctor.    Name of Caller:  patient   When is the first available appointment?  Was scheduled for 6/2  Symptoms:  lab follow up  Best Call Back Number:  [phone]   Additional Information:  per patient tested positive for COVID and states she had to cancel, but wanted to know if someone could just call and give her the results of her lab work, per  unable to find an existing or cancelled appointment for 6/2-please advise-thank you

## 2022-06-01 NOTE — TELEPHONE ENCOUNTER
Attempted to contact pt. No answer, unable to leave message.    No visit for 6/2/2022 found in patient's chart.

## 2022-06-08 ENCOUNTER — PATIENT OUTREACH (OUTPATIENT)
Dept: ADMINISTRATIVE | Facility: HOSPITAL | Age: 82
End: 2022-06-08
Payer: MEDICARE

## 2022-06-08 ENCOUNTER — OFFICE VISIT (OUTPATIENT)
Dept: FAMILY MEDICINE | Facility: CLINIC | Age: 82
End: 2022-06-08
Payer: MEDICARE

## 2022-06-08 VITALS
SYSTOLIC BLOOD PRESSURE: 122 MMHG | BODY MASS INDEX: 35.84 KG/M2 | DIASTOLIC BLOOD PRESSURE: 64 MMHG | WEIGHT: 194.75 LBS | HEIGHT: 62 IN

## 2022-06-08 DIAGNOSIS — E11.9 TYPE 2 DIABETES MELLITUS WITHOUT COMPLICATION, WITHOUT LONG-TERM CURRENT USE OF INSULIN: ICD-10-CM

## 2022-06-08 DIAGNOSIS — E78.2 MIXED HYPERLIPIDEMIA: ICD-10-CM

## 2022-06-08 DIAGNOSIS — R74.8 ELEVATED LIVER ENZYMES: Primary | ICD-10-CM

## 2022-06-08 DIAGNOSIS — E66.01 SEVERE OBESITY (BMI 35.0-39.9) WITH COMORBIDITY: ICD-10-CM

## 2022-06-08 DIAGNOSIS — Z00.00 MEDICARE ANNUAL WELLNESS VISIT, SUBSEQUENT: ICD-10-CM

## 2022-06-08 DIAGNOSIS — I70.0 AORTIC ATHEROSCLEROSIS: ICD-10-CM

## 2022-06-08 PROCEDURE — 3078F DIAST BP <80 MM HG: CPT | Mod: CPTII,S$GLB,, | Performed by: INTERNAL MEDICINE

## 2022-06-08 PROCEDURE — 3074F PR MOST RECENT SYSTOLIC BLOOD PRESSURE < 130 MM HG: ICD-10-PCS | Mod: CPTII,S$GLB,, | Performed by: INTERNAL MEDICINE

## 2022-06-08 PROCEDURE — 3051F PR MOST RECENT HEMOGLOBIN A1C LEVEL 7.0 - < 8.0%: ICD-10-PCS | Mod: CPTII,S$GLB,, | Performed by: INTERNAL MEDICINE

## 2022-06-08 PROCEDURE — 1101F PR PT FALLS ASSESS DOC 0-1 FALLS W/OUT INJ PAST YR: ICD-10-PCS | Mod: CPTII,S$GLB,, | Performed by: INTERNAL MEDICINE

## 2022-06-08 PROCEDURE — 99214 PR OFFICE/OUTPT VISIT, EST, LEVL IV, 30-39 MIN: ICD-10-PCS | Mod: S$GLB,,, | Performed by: INTERNAL MEDICINE

## 2022-06-08 PROCEDURE — 1101F PT FALLS ASSESS-DOCD LE1/YR: CPT | Mod: CPTII,S$GLB,, | Performed by: INTERNAL MEDICINE

## 2022-06-08 PROCEDURE — 1125F AMNT PAIN NOTED PAIN PRSNT: CPT | Mod: CPTII,S$GLB,, | Performed by: INTERNAL MEDICINE

## 2022-06-08 PROCEDURE — 1159F MED LIST DOCD IN RCRD: CPT | Mod: CPTII,S$GLB,, | Performed by: INTERNAL MEDICINE

## 2022-06-08 PROCEDURE — 99999 PR PBB SHADOW E&M-EST. PATIENT-LVL IV: ICD-10-PCS | Mod: PBBFAC,,, | Performed by: INTERNAL MEDICINE

## 2022-06-08 PROCEDURE — 3288F FALL RISK ASSESSMENT DOCD: CPT | Mod: CPTII,S$GLB,, | Performed by: INTERNAL MEDICINE

## 2022-06-08 PROCEDURE — 3074F SYST BP LT 130 MM HG: CPT | Mod: CPTII,S$GLB,, | Performed by: INTERNAL MEDICINE

## 2022-06-08 PROCEDURE — 3078F PR MOST RECENT DIASTOLIC BLOOD PRESSURE < 80 MM HG: ICD-10-PCS | Mod: CPTII,S$GLB,, | Performed by: INTERNAL MEDICINE

## 2022-06-08 PROCEDURE — 99214 OFFICE O/P EST MOD 30 MIN: CPT | Mod: S$GLB,,, | Performed by: INTERNAL MEDICINE

## 2022-06-08 PROCEDURE — 99999 PR PBB SHADOW E&M-EST. PATIENT-LVL IV: CPT | Mod: PBBFAC,,, | Performed by: INTERNAL MEDICINE

## 2022-06-08 PROCEDURE — 3051F HG A1C>EQUAL 7.0%<8.0%: CPT | Mod: CPTII,S$GLB,, | Performed by: INTERNAL MEDICINE

## 2022-06-08 PROCEDURE — 1159F PR MEDICATION LIST DOCUMENTED IN MEDICAL RECORD: ICD-10-PCS | Mod: CPTII,S$GLB,, | Performed by: INTERNAL MEDICINE

## 2022-06-08 PROCEDURE — 1125F PR PAIN SEVERITY QUANTIFIED, PAIN PRESENT: ICD-10-PCS | Mod: CPTII,S$GLB,, | Performed by: INTERNAL MEDICINE

## 2022-06-08 PROCEDURE — 3288F PR FALLS RISK ASSESSMENT DOCUMENTED: ICD-10-PCS | Mod: CPTII,S$GLB,, | Performed by: INTERNAL MEDICINE

## 2022-06-08 PROCEDURE — 1160F PR REVIEW ALL MEDS BY PRESCRIBER/CLIN PHARMACIST DOCUMENTED: ICD-10-PCS | Mod: CPTII,S$GLB,, | Performed by: INTERNAL MEDICINE

## 2022-06-08 PROCEDURE — 1160F RVW MEDS BY RX/DR IN RCRD: CPT | Mod: CPTII,S$GLB,, | Performed by: INTERNAL MEDICINE

## 2022-06-08 RX ORDER — METFORMIN HYDROCHLORIDE 500 MG/1
500 TABLET, EXTENDED RELEASE ORAL
Qty: 90 TABLET | Refills: 3 | Status: SHIPPED | OUTPATIENT
Start: 2022-06-08 | End: 2022-12-08 | Stop reason: SDUPTHER

## 2022-06-08 RX ORDER — AMOXICILLIN 500 MG/1
CAPSULE ORAL
COMMUNITY

## 2022-06-08 RX ORDER — ROSUVASTATIN CALCIUM 5 MG/1
5 TABLET, COATED ORAL DAILY
Qty: 90 TABLET | Refills: 3 | Status: SHIPPED | OUTPATIENT
Start: 2022-06-08 | End: 2022-08-16

## 2022-06-08 NOTE — LETTER
AUTHORIZATION FOR RELEASE OF   CONFIDENTIAL INFORMATION    Dear Linus Zamorano MD,    We are seeing Jacqui Luther, date of birth 1940, in the clinic at [facility]. Willis Womack MD is the patient's PCP. Jacqui Luther has an outstanding lab/procedure at the time we reviewed her chart. In order to help keep her health information updated, she has authorized us to request the following medical record(s):        (  )  MAMMOGRAM                                      (  )  COLONOSCOPY      (  )  PAP SMEAR                                          (  )  OUTSIDE LAB RESULTS     (  )  DEXA SCAN                                          (X)  DIABETIC EYE EXAM            (  )  FOOT EXAM                                          (  )  ENTIRE RECORD     (  )  OUTSIDE IMMUNIZATIONS                 (  )  _______________         Please fax records to [facility], Brandon D Simon-Davis, MD,[phone]    If you have any questions, please contact Samir Valdez LPN Care Coordinator  at [phone].              Patient Name: Jacqui Luther  : 1940  Patient Phone #: [phone]

## 2022-06-08 NOTE — PROGRESS NOTES
"  Subjective:     Get old note      Patient ID: Jacqui Luther is a 81 y.o. female.    Chief Complaint: Follow-up (6 month follow up and lab results from [facility])  Gyn: no defer age   MMG:  [facility] 11/15/21   Dexa: yes [facility]   Colonoscopy:  yes Dr Callahan   Immunizations: Flu:  Yes Tdap: rx to pharm Pneumovax: 12/1/21  Prevnar 13: 2020  Shingles: old Covid: yes   Smoker: no   Eye: Dr Zamorano 2022 (5/26)    Follow-up  Associated symptoms include myalgias. Pertinent negatives include no chest pain or joint swelling.     5/2022 Labs:  a1c 7.5, , G 156, ast alt 69, 110     May 2022 had mild sore throat runny nose sneezing, cough dry.  home testing 2 x positive.  Took Paxlovid.   Feeling fine now.     Type 2 Diabetes:  Worsening fasting sugar and A1c.  a1c 7 to 7.5 not taking any meds. //   Gained weight and fasting G up to 156 // Prev took Metformin caused fatigue, sleepiness.  Start med today  Pre renal labs - recommend hydration before next labs.  Recheck normal  Mild elevated AST/ALT had COVID more recently levels mildly more elevated will recheck in 6 months.    Cardio:   HLD:  LDL goal <70//  improve diet recheck Controlled: Rx Crestor 5.  Atherosclerosis arteries    Review CT 2019 shows benign left adrenal adenoma, B kidney cyst.      Review of Systems:  Review of Systems   Constitutional: Negative for appetite change.   HENT: Negative for nosebleeds.    Eyes: Negative for pain.   Respiratory: Negative for choking.    Cardiovascular: Negative for chest pain.   Gastrointestinal: Negative for blood in stool.   Genitourinary: Negative for hematuria.   Musculoskeletal: Positive for myalgias. Negative for joint swelling.   Skin: Negative for pallor.   Neurological: Negative for facial asymmetry.   Hematological: Does not bruise/bleed easily.   Psychiatric/Behavioral: Negative for confusion.       Objective:     Vitals:    06/08/22 0935   BP: 122/64   Weight: 88.3 kg (194 lb 12.4 oz)   Height: 5' 2" (1.575 m) "          Physical Exam  Vitals reviewed.   Constitutional:       Appearance: Normal appearance.   HENT:      Head: Normocephalic and atraumatic.      Mouth/Throat:      Pharynx: Oropharynx is clear.   Eyes:      Extraocular Movements: Extraocular movements intact.      Conjunctiva/sclera: Conjunctivae normal.      Pupils: Pupils are equal, round, and reactive to light.   Cardiovascular:      Rate and Rhythm: Normal rate and regular rhythm.      Heart sounds: Normal heart sounds.   Pulmonary:      Effort: Pulmonary effort is normal.      Breath sounds: Normal breath sounds.   Abdominal:      General: Bowel sounds are normal.      Palpations: Abdomen is soft.   Musculoskeletal:         General: Normal range of motion.      Cervical back: Normal range of motion and neck supple.   Skin:     General: Skin is warm and dry.   Neurological:      General: No focal deficit present.      Mental Status: She is alert and oriented to person, place, and time.   Psychiatric:         Mood and Affect: Mood normal.         Medication List with Changes/Refills   New Medications    METFORMIN (GLUCOPHAGE-XR) 500 MG ER 24HR TABLET    Take 1 tablet (500 mg total) by mouth daily with breakfast.   Current Medications    AMOXICILLIN (AMOXIL) 500 MG CAPSULE    amoxicillin 500 mg capsule   TAKE FOUR CAPSULES BY MOUTH 30 60 MINUTES BEFORE DENTAL WORK    ASPIRIN 81 MG CAP    Take 81 mg by mouth once.    COENZYME Q10 200 MG CAPSULE    Take 200 mg by mouth once daily.    FISH,BORA,FLAX OILS-OM3,6,9 #1 1,200 MG CAP    Take by mouth. 3 capsules daily    GLUCOSAMINE/CHONDR FULLER A SOD (OSTEO BI-FLEX ORAL)    Take 2 capsules by mouth once daily.    MULTIVITAMIN WITH MINERALS TABLET    Take 1 tablet by mouth once daily.    TURMERIC (CURCUMIN MISC)    1 capsule by Misc.(Non-Drug; Combo Route) route.   Changed and/or Refilled Medications    Modified Medication Previous Medication    ROSUVASTATIN (CRESTOR) 5 MG TABLET rosuvastatin (CRESTOR) 5 MG tablet        Take 1 tablet (5 mg total) by mouth once daily.    Take 1 tablet (5 mg total) by mouth once daily.       Assessment & Plan:  1. Elevated liver enzymes  - Comprehensive Metabolic Panel; Future  - Comprehensive Metabolic Panel    2. Mixed hyperlipidemia  - Lipid Panel; Future  - Lipid Panel    3. Medicare annual wellness visit, subsequent  - Comprehensive Metabolic Panel; Future  - Hemoglobin A1C; Future  - Lipid Panel; Future  - Microalbumin/Creatinine Ratio, Urine; Future  - Urinalysis; Future  - Comprehensive Metabolic Panel  - Hemoglobin A1C  - Lipid Panel  - Microalbumin/Creatinine Ratio, Urine  - Urinalysis    4. Type 2 diabetes mellitus without complication, without long-term current use of insulin  - Hemoglobin A1C; Future  - Microalbumin/Creatinine Ratio, Urine; Future  - Urinalysis; Future  - Hemoglobin A1C  - Microalbumin/Creatinine Ratio, Urine  - Urinalysis    5. Severe obesity (BMI 35.0-39.9) with comorbidity    6. Aortic atherosclerosis     Elevated liver enzymes  -     Comprehensive Metabolic Panel; Future; Expected date: 06/08/2022    Mixed hyperlipidemia  -     Lipid Panel; Future; Expected date: 06/08/2022    Medicare annual wellness visit, subsequent  -     Comprehensive Metabolic Panel; Future; Expected date: 06/08/2022  -     Hemoglobin A1C; Future; Expected date: 06/08/2022  -     Lipid Panel; Future; Expected date: 06/08/2022  -     Microalbumin/Creatinine Ratio, Urine; Future; Expected date: 06/08/2022  -     Urinalysis; Future; Expected date: 06/08/2022    Type 2 diabetes mellitus without complication, without long-term current use of insulin  -     Hemoglobin A1C; Future; Expected date: 06/08/2022  -     Microalbumin/Creatinine Ratio, Urine; Future; Expected date: 06/08/2022  -     Urinalysis; Future; Expected date: 06/08/2022    Severe obesity (BMI 35.0-39.9) with comorbidity    Aortic atherosclerosis    Other orders  -     rosuvastatin (CRESTOR) 5 MG tablet; Take 1 tablet (5 mg total) by  mouth once daily.  Dispense: 90 tablet; Refill: 3  -     metFORMIN (GLUCOPHAGE-XR) 500 MG ER 24hr tablet; Take 1 tablet (500 mg total) by mouth daily with breakfast.  Dispense: 90 tablet; Refill: 3        Continue to work on regular exercise, maintain healthy weight, balanced diet. Avoid unhealthy habits: smoking, excessive alcohol intake.

## 2022-06-09 ENCOUNTER — PATIENT OUTREACH (OUTPATIENT)
Dept: ADMINISTRATIVE | Facility: HOSPITAL | Age: 82
End: 2022-06-09
Payer: MEDICARE

## 2022-08-16 ENCOUNTER — TELEPHONE (OUTPATIENT)
Dept: FAMILY MEDICINE | Facility: CLINIC | Age: 82
End: 2022-08-16
Payer: MEDICARE

## 2022-08-16 NOTE — TELEPHONE ENCOUNTER
----- Message from Ngoc Sanchez sent at 8/16/2022  3:41 PM CDT -----  Contact: pt  Type:  RX Refill Request    Who Called:  Pt   Refill or New Rx:  refill   RX Name and Strength:  rosuvastatin (CRESTOR) 5 MG   How is the patient currently taking it? (ex. 1XDay):  1 day   Is this a 30 day or 90 day RX:  90  Preferred Pharmacy with phone number:    [facility] - [place], [place] - [address]  [address]  [facility] [address]  Phone: [phone] Fax: [phone]    Local or Mail Order:  local   Ordering Provider:  ELI Womack Call Back Number:  [phone]  Additional Information:  Pt is calling the office to get a refill of rosuvastatin (CRESTOR) 5 MG . Pt adv she has about 10 left... please call and adv--

## 2022-08-16 NOTE — TELEPHONE ENCOUNTER
Being addressed in refill encounter.   Pt aware she will be contact when medication has been sent.

## 2022-08-31 DIAGNOSIS — Z78.0 MENOPAUSE: ICD-10-CM

## 2022-09-15 ENCOUNTER — PES CALL (OUTPATIENT)
Dept: ADMINISTRATIVE | Facility: CLINIC | Age: 82
End: 2022-09-15
Payer: MEDICARE

## 2022-09-23 ENCOUNTER — TELEPHONE (OUTPATIENT)
Dept: FAMILY MEDICINE | Facility: CLINIC | Age: 82
End: 2022-09-23
Payer: MEDICARE

## 2022-09-23 DIAGNOSIS — Z12.39 ENCOUNTER FOR BREAST CANCER SCREENING USING NON-MAMMOGRAM MODALITY: Primary | ICD-10-CM

## 2022-09-23 DIAGNOSIS — Z12.31 ENCOUNTER FOR SCREENING MAMMOGRAM FOR BREAST CANCER: ICD-10-CM

## 2022-09-23 NOTE — TELEPHONE ENCOUNTER
----- Message from Nisa Joyner sent at 9/22/2022  1:45 PM CDT -----  Contact: [phone]  Type: Needs Medical Advice  Who Called: Pt   Best Call Back Number: [phone]    Additional Information: Pt is asking to have her mammo order sent to [facility]. Pt stated no need to call her back.

## 2022-09-28 ENCOUNTER — IMMUNIZATION (OUTPATIENT)
Dept: FAMILY MEDICINE | Facility: CLINIC | Age: 82
End: 2022-09-28
Payer: MEDICARE

## 2022-09-28 DIAGNOSIS — Z23 NEED FOR VACCINATION: Primary | ICD-10-CM

## 2022-09-28 PROCEDURE — 91312 COVID-19, MRNA, LNP-S, BIVALENT BOOSTER, PF, 30 MCG/0.3 ML DOSE: CPT | Mod: S$GLB,,, | Performed by: FAMILY MEDICINE

## 2022-09-28 PROCEDURE — 0124A PR IMMUNIZ ADMIN, SARS-COV- 2 COVID-19 VACCINE, 30MCG/0.3ML, BIVALENT, BOOSTER DOSE: ICD-10-PCS | Mod: S$GLB,,, | Performed by: FAMILY MEDICINE

## 2022-09-28 PROCEDURE — 0124A PR IMMUNIZ ADMIN, SARS-COV- 2 COVID-19 VACCINE, 30MCG/0.3ML, BIVALENT, BOOSTER DOSE: CPT | Mod: S$GLB,,, | Performed by: FAMILY MEDICINE

## 2022-09-28 PROCEDURE — 0124A COVID-19, MRNA, LNP-S, BIVALENT BOOSTER, PF, 30 MCG/0.3 ML DOSE: CPT | Mod: PBBFAC

## 2022-09-28 PROCEDURE — 91312 COVID-19, MRNA, LNP-S, BIVALENT BOOSTER, PF, 30 MCG/0.3 ML DOSE: ICD-10-PCS | Mod: S$GLB,,, | Performed by: FAMILY MEDICINE

## 2022-09-29 ENCOUNTER — TELEPHONE (OUTPATIENT)
Dept: NEUROLOGY | Facility: CLINIC | Age: 82
End: 2022-09-29
Payer: MEDICARE

## 2022-09-29 NOTE — TELEPHONE ENCOUNTER
Spoke to pt informed her we need the medical records from her EG/hosp visit faxed to the office.  Also will need CD of all the imaging that was done.  She vergalized understanding and will call back to schedule the appt once she has all of the records.

## 2022-09-29 NOTE — TELEPHONE ENCOUNTER
----- Message from Gurpreet Wood sent at 2022  9:44 AM CDT -----  Regardin-3wk HOSP f/u TIA discharge [place]   Type:  HOSP F/U Appointment Request    Caller is requesting a HOSP F/U appointment.      Name of Caller:  Jacqui    When is the first available appointment?  None in time frame needed.    Symptoms:  2-3wk HOSP f/u TIA discharge [place]     Best Call Back Number:  [phone]    Additional Information:   [facility] did labs, CT, MRI, EKG and EEG all NEG according to PT. Had HBP and High Sugar. [facility] added Plavix to meds for PT.

## 2022-10-11 ENCOUNTER — TELEPHONE (OUTPATIENT)
Dept: FAMILY MEDICINE | Facility: CLINIC | Age: 82
End: 2022-10-11
Payer: MEDICARE

## 2022-10-11 NOTE — TELEPHONE ENCOUNTER
----- Message from Jia Rodriguez sent at 10/11/2022  8:09 AM CDT -----  Contact: pt at [phone]  Type: Needs Medical Advice  Who Called:  pt    Best Call Back Number: [phone]    Additional Information: pt is calling the office stating she usually get refills for rosuvastatin (CRESTOR) 5 MG tablet but this time she did not, she states she have a few left but need a new script with refills. She also need Plavix 75 mg 1xday with 3 refills on it. Please call back and advise.

## 2022-10-11 NOTE — TELEPHONE ENCOUNTER
Patient Requesting Refill  LOV:6/8/22  Last fill date:  Allergies reviewed  Medication Pended    Pt was admitted to [place] for a TIA and was prescribed plavix 75 mg 1x daily. Requesting a refill. No hosp fu appt was scheduled. Pt has 6 mth appt scheduled in Dec.   Please advise

## 2022-10-11 NOTE — TELEPHONE ENCOUNTER
----- Message from Jia Rodriguez sent at 10/11/2022  8:09 AM CDT -----  Contact: pt at [phone]  Type: Needs Medical Advice  Who Called:  pt    Best Call Back Number: [phone]    Additional Information: pt is calling the office stating she usually get refills for rosuvastatin (CRESTOR) 5 MG tablet but this time she did not, she states she have a few left but need a new script with refills. She also need Plavix 75 mg 1xday with 3 refills on it. Please call back and advise.

## 2022-10-12 RX ORDER — ROSUVASTATIN CALCIUM 5 MG/1
5 TABLET, COATED ORAL DAILY
Qty: 90 TABLET | Refills: 2 | Status: SHIPPED | OUTPATIENT
Start: 2022-10-12 | End: 2022-12-08 | Stop reason: SDUPTHER

## 2022-10-14 ENCOUNTER — TELEPHONE (OUTPATIENT)
Dept: FAMILY MEDICINE | Facility: CLINIC | Age: 82
End: 2022-10-14
Payer: MEDICARE

## 2022-10-14 ENCOUNTER — TELEPHONE (OUTPATIENT)
Dept: FAMILY MEDICINE | Facility: CLINIC | Age: 82
End: 2022-10-14

## 2022-10-14 NOTE — TELEPHONE ENCOUNTER
----- Message from Brian Villarreal sent at 10/14/2022  8:54 AM CDT -----  Contact: pt  Type:  Patient Returning Call    Who Called:  pt   Who Left Message for Patient:  Arlyn Mar   Does the patient know what this is regarding?:  her refill on her medication   Best Call Back Number:  [phone]  Additional Information:  pt states she never receive her refill for her Plavix 75 mg 1xday with 3 refills on it and missed a call from you all yesterday. Please call back and advise.

## 2022-10-14 NOTE — TELEPHONE ENCOUNTER
----- Message from Kina Emmanuel sent at 10/14/2022  3:31 PM CDT -----  Regarding: refill  Can the clinic reply in [facility]:       Please refill the medication(s) listed below. Please call the patient when the prescription(s) is ready for  at this phone number   MIKE ILENE ISHA [[id number]] [phone] (home) [phone] (work) pt received medication in the hospital and is requesting for Dr to refill. Please advise            Medication #1 plavix 75mg ( req 90 day supply)          Preferred Pharmacy:   [facility] [address] [place], [place] [address]  [address]  [facility] [address]  Phone: [phone] Fax: [phone]

## 2022-11-28 ENCOUNTER — TELEPHONE (OUTPATIENT)
Dept: FAMILY MEDICINE | Facility: CLINIC | Age: 82
End: 2022-11-28
Payer: MEDICARE

## 2022-11-28 DIAGNOSIS — E11.9 TYPE 2 DIABETES MELLITUS WITHOUT COMPLICATION, WITHOUT LONG-TERM CURRENT USE OF INSULIN: ICD-10-CM

## 2022-11-28 DIAGNOSIS — Z01.89 ENCOUNTER FOR ROUTINE LABORATORY TESTING: Primary | ICD-10-CM

## 2022-11-28 DIAGNOSIS — E78.2 MIXED HYPERLIPIDEMIA: ICD-10-CM

## 2022-11-28 DIAGNOSIS — R74.8 ELEVATED LIVER ENZYMES: ICD-10-CM

## 2022-11-28 NOTE — TELEPHONE ENCOUNTER
----- Message from Sujata Abbasi sent at 11/28/2022  8:49 AM CST -----  Contact: self  Type: Needs Medical Advice  Who Called: patient   Best Call Back Number: [phone]  Additional Information: Patient states she needs orders put in prior to her abdi on 12/8/2022. Molly put her labs in to the [place] location lab says the [facility] to [facility] to is closed down she now wants it in [place] and the main [place]. Plz put blood lab  orders in for pt. Thanks

## 2022-11-28 NOTE — TELEPHONE ENCOUNTER
----- Message from Jaz Sparks, Patient Care Assistant sent at 11/28/2022  2:47 PM CST -----  Contact: self  Type:  RX Refill Request    Who Called:  self  Refill or New Rx:  refill  RX Name and Strength:  rosuvastatin (CRESTOR) 5 MG tablet  How is the patient currently taking it? (ex. 1XDay):  as directed   Is this a 30 day or 90 day RX:  90  Preferred Pharmacy with phone number:    [facility] - [place], [place] - [address]  [address]  [facility] [address]  Phone: [phone] Fax: [phone]  Local or Mail Order:  local  Ordering Provider:  Dr Shanta Greene Call Back Number:  [phone]  Additional Information:  thanks

## 2022-11-29 NOTE — TELEPHONE ENCOUNTER
Spoke w/ pt. Lab sched for 0798 on 12/2/22 at [facility] per pt request. Pt aware to fast.

## 2022-12-02 ENCOUNTER — LAB VISIT (OUTPATIENT)
Dept: LAB | Facility: HOSPITAL | Age: 82
End: 2022-12-02
Payer: MEDICARE

## 2022-12-02 DIAGNOSIS — E11.9 TYPE 2 DIABETES MELLITUS WITHOUT COMPLICATION, WITHOUT LONG-TERM CURRENT USE OF INSULIN: ICD-10-CM

## 2022-12-02 DIAGNOSIS — R74.8 ELEVATED LIVER ENZYMES: ICD-10-CM

## 2022-12-02 DIAGNOSIS — Z01.89 ENCOUNTER FOR ROUTINE LABORATORY TESTING: ICD-10-CM

## 2022-12-02 DIAGNOSIS — E78.2 MIXED HYPERLIPIDEMIA: ICD-10-CM

## 2022-12-02 LAB
ALBUMIN SERPL BCP-MCNC: 4.1 G/DL (ref 3.5–5.2)
ALP SERPL-CCNC: 60 U/L (ref 55–135)
ALT SERPL W/O P-5'-P-CCNC: 117 U/L (ref 10–44)
ANION GAP SERPL CALC-SCNC: 9 MMOL/L (ref 8–16)
AST SERPL-CCNC: 62 U/L (ref 10–40)
BILIRUB SERPL-MCNC: 0.4 MG/DL (ref 0.1–1)
BUN SERPL-MCNC: 16 MG/DL (ref 8–23)
CALCIUM SERPL-MCNC: 10.1 MG/DL (ref 8.7–10.5)
CHLORIDE SERPL-SCNC: 104 MMOL/L (ref 95–110)
CHOLEST SERPL-MCNC: 190 MG/DL (ref 120–199)
CHOLEST/HDLC SERPL: 2.9 {RATIO} (ref 2–5)
CO2 SERPL-SCNC: 23 MMOL/L (ref 23–29)
CREAT SERPL-MCNC: 0.9 MG/DL (ref 0.5–1.4)
EST. GFR  (NO RACE VARIABLE): >60 ML/MIN/1.73 M^2
ESTIMATED AVG GLUCOSE: 160 MG/DL (ref 68–131)
GLUCOSE SERPL-MCNC: 160 MG/DL (ref 70–110)
HBA1C MFR BLD: 7.2 % (ref 4–5.6)
HDLC SERPL-MCNC: 66 MG/DL (ref 40–75)
HDLC SERPL: 34.7 % (ref 20–50)
LDLC SERPL CALC-MCNC: 101.6 MG/DL (ref 63–159)
NONHDLC SERPL-MCNC: 124 MG/DL
POTASSIUM SERPL-SCNC: 5 MMOL/L (ref 3.5–5.1)
PROT SERPL-MCNC: 7.3 G/DL (ref 6–8.4)
SODIUM SERPL-SCNC: 136 MMOL/L (ref 136–145)
TRIGL SERPL-MCNC: 112 MG/DL (ref 30–150)

## 2022-12-02 PROCEDURE — 83036 HEMOGLOBIN GLYCOSYLATED A1C: CPT | Performed by: INTERNAL MEDICINE

## 2022-12-02 PROCEDURE — 80053 COMPREHEN METABOLIC PANEL: CPT | Performed by: INTERNAL MEDICINE

## 2022-12-02 PROCEDURE — 80061 LIPID PANEL: CPT | Performed by: INTERNAL MEDICINE

## 2022-12-02 PROCEDURE — 36415 COLL VENOUS BLD VENIPUNCTURE: CPT | Mod: PO | Performed by: INTERNAL MEDICINE

## 2022-12-08 ENCOUNTER — PATIENT OUTREACH (OUTPATIENT)
Dept: ADMINISTRATIVE | Facility: HOSPITAL | Age: 82
End: 2022-12-08
Payer: MEDICARE

## 2022-12-08 ENCOUNTER — OFFICE VISIT (OUTPATIENT)
Dept: FAMILY MEDICINE | Facility: CLINIC | Age: 82
End: 2022-12-08
Payer: MEDICARE

## 2022-12-08 VITALS
HEIGHT: 62 IN | SYSTOLIC BLOOD PRESSURE: 142 MMHG | DIASTOLIC BLOOD PRESSURE: 72 MMHG | BODY MASS INDEX: 35.43 KG/M2 | OXYGEN SATURATION: 97 % | HEART RATE: 74 BPM | WEIGHT: 192.56 LBS

## 2022-12-08 DIAGNOSIS — E11.9 TYPE 2 DIABETES MELLITUS WITHOUT COMPLICATION, WITHOUT LONG-TERM CURRENT USE OF INSULIN: ICD-10-CM

## 2022-12-08 DIAGNOSIS — E66.01 SEVERE OBESITY (BMI 35.0-39.9) WITH COMORBIDITY: ICD-10-CM

## 2022-12-08 DIAGNOSIS — Z86.73 HISTORY OF TRANSIENT ISCHEMIC ATTACK (TIA): ICD-10-CM

## 2022-12-08 DIAGNOSIS — I70.0 AORTIC ATHEROSCLEROSIS: ICD-10-CM

## 2022-12-08 DIAGNOSIS — I10 EPISODE OF HYPERTENSION: ICD-10-CM

## 2022-12-08 DIAGNOSIS — Z01.89 ENCOUNTER FOR ROUTINE LABORATORY TESTING: ICD-10-CM

## 2022-12-08 DIAGNOSIS — E78.2 MIXED HYPERLIPIDEMIA: ICD-10-CM

## 2022-12-08 DIAGNOSIS — Z00.00 MEDICARE ANNUAL WELLNESS VISIT, SUBSEQUENT: Primary | ICD-10-CM

## 2022-12-08 PROCEDURE — 1159F PR MEDICATION LIST DOCUMENTED IN MEDICAL RECORD: ICD-10-PCS | Mod: CPTII,S$GLB,, | Performed by: INTERNAL MEDICINE

## 2022-12-08 PROCEDURE — 1126F PR PAIN SEVERITY QUANTIFIED, NO PAIN PRESENT: ICD-10-PCS | Mod: CPTII,S$GLB,, | Performed by: INTERNAL MEDICINE

## 2022-12-08 PROCEDURE — 3077F PR MOST RECENT SYSTOLIC BLOOD PRESSURE >= 140 MM HG: ICD-10-PCS | Mod: CPTII,S$GLB,, | Performed by: INTERNAL MEDICINE

## 2022-12-08 PROCEDURE — 3288F PR FALLS RISK ASSESSMENT DOCUMENTED: ICD-10-PCS | Mod: CPTII,S$GLB,, | Performed by: INTERNAL MEDICINE

## 2022-12-08 PROCEDURE — 99213 PR OFFICE/OUTPT VISIT, EST, LEVL III, 20-29 MIN: ICD-10-PCS | Mod: 25,S$GLB,, | Performed by: INTERNAL MEDICINE

## 2022-12-08 PROCEDURE — 1101F PR PT FALLS ASSESS DOC 0-1 FALLS W/OUT INJ PAST YR: ICD-10-PCS | Mod: CPTII,S$GLB,, | Performed by: INTERNAL MEDICINE

## 2022-12-08 PROCEDURE — 1159F MED LIST DOCD IN RCRD: CPT | Mod: CPTII,S$GLB,, | Performed by: INTERNAL MEDICINE

## 2022-12-08 PROCEDURE — 99999 PR PBB SHADOW E&M-EST. PATIENT-LVL III: CPT | Mod: PBBFAC,,, | Performed by: INTERNAL MEDICINE

## 2022-12-08 PROCEDURE — 3078F PR MOST RECENT DIASTOLIC BLOOD PRESSURE < 80 MM HG: ICD-10-PCS | Mod: CPTII,S$GLB,, | Performed by: INTERNAL MEDICINE

## 2022-12-08 PROCEDURE — 1126F AMNT PAIN NOTED NONE PRSNT: CPT | Mod: CPTII,S$GLB,, | Performed by: INTERNAL MEDICINE

## 2022-12-08 PROCEDURE — 99999 PR PBB SHADOW E&M-EST. PATIENT-LVL III: ICD-10-PCS | Mod: PBBFAC,,, | Performed by: INTERNAL MEDICINE

## 2022-12-08 PROCEDURE — 1160F RVW MEDS BY RX/DR IN RCRD: CPT | Mod: CPTII,S$GLB,, | Performed by: INTERNAL MEDICINE

## 2022-12-08 PROCEDURE — 1101F PT FALLS ASSESS-DOCD LE1/YR: CPT | Mod: CPTII,S$GLB,, | Performed by: INTERNAL MEDICINE

## 2022-12-08 PROCEDURE — 99213 OFFICE O/P EST LOW 20 MIN: CPT | Mod: 25,S$GLB,, | Performed by: INTERNAL MEDICINE

## 2022-12-08 PROCEDURE — 1160F PR REVIEW ALL MEDS BY PRESCRIBER/CLIN PHARMACIST DOCUMENTED: ICD-10-PCS | Mod: CPTII,S$GLB,, | Performed by: INTERNAL MEDICINE

## 2022-12-08 PROCEDURE — G0439 PPPS, SUBSEQ VISIT: HCPCS | Mod: S$GLB,,, | Performed by: INTERNAL MEDICINE

## 2022-12-08 PROCEDURE — 3288F FALL RISK ASSESSMENT DOCD: CPT | Mod: CPTII,S$GLB,, | Performed by: INTERNAL MEDICINE

## 2022-12-08 PROCEDURE — G0439 PR MEDICARE ANNUAL WELLNESS SUBSEQUENT VISIT: ICD-10-PCS | Mod: S$GLB,,, | Performed by: INTERNAL MEDICINE

## 2022-12-08 PROCEDURE — 3077F SYST BP >= 140 MM HG: CPT | Mod: CPTII,S$GLB,, | Performed by: INTERNAL MEDICINE

## 2022-12-08 PROCEDURE — 3078F DIAST BP <80 MM HG: CPT | Mod: CPTII,S$GLB,, | Performed by: INTERNAL MEDICINE

## 2022-12-08 RX ORDER — ROSUVASTATIN CALCIUM 5 MG/1
5 TABLET, COATED ORAL DAILY
Qty: 90 TABLET | Refills: 2 | Status: SHIPPED | OUTPATIENT
Start: 2022-12-08 | End: 2023-07-07 | Stop reason: SDUPTHER

## 2022-12-08 RX ORDER — CLOPIDOGREL BISULFATE 75 MG/1
TABLET ORAL
COMMUNITY
Start: 2022-10-18

## 2022-12-08 RX ORDER — METFORMIN HYDROCHLORIDE 500 MG/1
500 TABLET, EXTENDED RELEASE ORAL 2 TIMES DAILY WITH MEALS
Qty: 180 TABLET | Refills: 2 | Status: SHIPPED | OUTPATIENT
Start: 2022-12-08 | End: 2023-07-07 | Stop reason: SDUPTHER

## 2022-12-08 RX ORDER — ROSUVASTATIN CALCIUM 10 MG/1
10 TABLET, COATED ORAL DAILY
Qty: 90 TABLET | Refills: 2 | Status: SHIPPED | OUTPATIENT
Start: 2022-12-08 | End: 2022-12-08

## 2022-12-08 NOTE — PROGRESS NOTES
HRA: patient feels overall is healthy.  Psychosocial and behavioral risks discussed.  BMI - 35  Weight loss discussed.   Diet - well balanced.   ADL: self sufficient in all  Instrumental ADL: patient is able to manage things like their medications and finances.    Memory or cognitive function - Patient has no issues with either   Ambulates normal. No recent falls.  Exercise - walks some limitations from OA  Depression screening is negative.  Hearing--no deficits.  Vision - no deficits.// Dr Zamorano    Incontinence - none  Opiate screen negative     Preventative health needs discussed and patient was given a printed list of what they have received and what they will need with in the next 5-10 years.  Screening schedule reviewed with patient     Advanced Care directive: yes has in place w daughter.    I have reviewed and updated the patient's current list of providers.     In addition to the patient's preventative review and discussion today, the patient also has other issues to discuss today with a separate summary of plan below:       Subjective:     Get old note    Patient ID: Jacqui Luther is a 82 y.o. female.    Chief Complaint: Annual Exam (6 month check up and review of blood work )      Had TIA - admitted to [place].  Epidose lasted 20 mins was with her sister during episode.    All the sudden starting having speech difficulties and then couldn't remember names and events - recall was slow. By time got to ER issues resolved. Was keep over night for monitoring and work up.  Hasnt made an appt w Neuro yet     Gyn: no defer age   MMG:  LV 11/2022  Dexa: yes LV 2019 normal   Colonoscopy:  yes Dr Callahan 8/2020 polyp  Immunizations: Flu:  Yes Tdap: rx to pharm Pneumovax: 12/1/21  Prevnar 13: 2020  Shingles: old Covid: yes   Smoker: no   Eye: Dr Zamorano 2022 (5/26)    Had episode of stomach pain and then nausea w vomiting 2x  bile green /brown on Monday out of blue. Has now resolved. .     Type 2 Diabetes:   A1c 7.2 prev 7.5 // Rx: Metformin .      Episode HTN:  monitor bring log.  Goal due to TIA and DM- to be < 130     HLD:  LDL goal <70//  Rx Crestor 5    Elevated LFT: AST/ALT 62/117     Atherosclerosis arteries: Dr Hurley    Review CT 2019 shows benign left adrenal adenoma, B kidney cyst.      Review of Systems:  Review of Systems   Constitutional:  Negative for appetite change.   HENT:  Negative for nosebleeds.    Eyes:  Negative for pain.   Respiratory:  Negative for choking.    Cardiovascular:  Negative for chest pain.   Gastrointestinal:  Negative for blood in stool.   Genitourinary:  Negative for hematuria.   Musculoskeletal:  Negative for joint swelling.   Skin:  Negative for pallor.   Neurological:  Negative for facial asymmetry.   Hematological:  Does not bruise/bleed easily.   Psychiatric/Behavioral:  Negative for confusion.      Objective:     Wt Readings from Last 3 Encounters:   12/08/22 87.4 kg (192 lb 9.2 oz)   06/08/22 88.3 kg (194 lb 12.4 oz)   12/01/21 87.1 kg (192 lb 0.3 oz)     Temp Readings from Last 3 Encounters:   No data found for Temp     BP Readings from Last 3 Encounters:   12/08/22 (!) 142/72   06/08/22 122/64   12/01/21 120/72     Pulse Readings from Last 3 Encounters:   12/08/22 74   05/24/21 70   09/10/19 77      Lab Results   Component Value Date    WBC 5.9 05/25/2022    HGB 13.5 05/25/2022    HCT 41.8 05/25/2022     05/25/2022    CHOL 190 12/02/2022    TRIG 112 12/02/2022    HDL 66 12/02/2022     (H) 12/02/2022    AST 62 (H) 12/02/2022     12/02/2022    K 5.0 12/02/2022     12/02/2022    CREATININE 0.9 12/02/2022    BUN 16 12/02/2022    CO2 23 12/02/2022    TSH 1.66 05/25/2022    HGBA1C 7.2 (H) 12/02/2022      Hemoglobin A1C   Date Value Ref Range Status   12/02/2022 7.2 (H) 4.0 - 5.6 % Final     Comment:     ADA Screening Guidelines:  5.7-6.4%  Consistent with prediabetes  >or=6.5%  Consistent with diabetes    High levels of fetal hemoglobin  interfere with the HbA1C  assay. Heterozygous hemoglobin variants (HbS, HgC, etc)do  not significantly interfere with this assay.   However, presence of multiple variants may affect accuracy.     05/25/2022 7.5 (H) <5.7 % of total Hgb Final     Comment:     For someone without known diabetes, a hemoglobin A1c  value of 6.5% or greater indicates that they may have   diabetes and this should be confirmed with a follow-up   test.     For someone with known diabetes, a value <7% indicates   that their diabetes is well controlled and a value   greater than or equal to 7% indicates suboptimal   control. A1c targets should be individualized based on   duration of diabetes, age, comorbid conditions, and   other considerations.     Currently, no consensus exists regarding use of  hemoglobin A1c for diagnosis of diabetes for children.         11/22/2021 7.0 (H) <5.7 % of total Hgb Final     Comment:     For someone without known diabetes, a hemoglobin A1c  value of 6.5% or greater indicates that they may have   diabetes and this should be confirmed with a follow-up   test.     For someone with known diabetes, a value <7% indicates   that their diabetes is well controlled and a value   greater than or equal to 7% indicates suboptimal   control. A1c targets should be individualized based on   duration of diabetes, age, comorbid conditions, and   other considerations.     Currently, no consensus exists regarding use of  hemoglobin A1c for diagnosis of diabetes for children.            Lab Results   Component Value Date    CHOL 190 12/02/2022    CHOL 193 05/25/2022    CHOL 182 11/22/2021     Lab Results   Component Value Date    HDL 66 12/02/2022    HDL 67 05/25/2022    HDL 65 11/22/2021     Lab Results   Component Value Date    LDLCALC 101.6 12/02/2022    LDLCALC 105 (H) 05/25/2022    LDLCALC 95 11/22/2021     Lab Results   Component Value Date    TRIG 112 12/02/2022    TRIG 117 05/25/2022    TRIG 122 11/22/2021     Lab Results    Component Value Date    CHOLHDL 34.7 12/02/2022    CHOLHDL 2.9 05/25/2022    CHOLHDL 2.8 11/22/2021             Physical Exam  Constitutional:       Appearance: Normal appearance.   HENT:      Head: Normocephalic and atraumatic.   Eyes:      Extraocular Movements: Extraocular movements intact.      Pupils: Pupils are equal, round, and reactive to light.   Cardiovascular:      Rate and Rhythm: Normal rate and regular rhythm.      Heart sounds: Normal heart sounds.   Pulmonary:      Effort: Pulmonary effort is normal.      Breath sounds: Normal breath sounds.   Neurological:      Mental Status: She is alert and oriented to person, place, and time.   Psychiatric:         Mood and Affect: Mood normal.       Medication List with Changes/Refills   Current Medications    AMOXICILLIN (AMOXIL) 500 MG CAPSULE    amoxicillin 500 mg capsule   TAKE FOUR CAPSULES BY MOUTH 30 60 MINUTES BEFORE DENTAL WORK    ASPIRIN 81 MG CAP    Take 81 mg by mouth once.    CLOPIDOGREL (PLAVIX) 75 MG TABLET        COENZYME Q10 200 MG CAPSULE    Take 200 mg by mouth once daily.    FISH,BORA,FLAX OILS-OM3,6,9 #1 1,200 MG CAP    Take by mouth. 3 capsules daily    GLUCOSAMINE/CHONDR FULLER A SOD (OSTEO BI-FLEX ORAL)    Take 2 capsules by mouth once daily.    MULTIVITAMIN WITH MINERALS TABLET    Take 1 tablet by mouth once daily.    TURMERIC (CURCUMIN MISC)    1 capsule by Misc.(Non-Drug; Combo Route) route.   Changed and/or Refilled Medications    Modified Medication Previous Medication    METFORMIN (GLUCOPHAGE-XR) 500 MG ER 24HR TABLET metFORMIN (GLUCOPHAGE-XR) 500 MG ER 24hr tablet       Take 1 tablet (500 mg total) by mouth 2 (two) times daily with meals.    Take 1 tablet (500 mg total) by mouth daily with breakfast.    ROSUVASTATIN (CRESTOR) 5 MG TABLET rosuvastatin (CRESTOR) 5 MG tablet       Take 1 tablet (5 mg total) by mouth once daily.    Take 1 tablet (5 mg total) by mouth once daily.       Assessment & Plan:  1. Medicare annual wellness  visit, subsequent    2. History of transient ischemic attack (TIA)    3. Episode of hypertension    4. Mixed hyperlipidemia  - rosuvastatin (CRESTOR) 5 MG tablet; Take 1 tablet (5 mg total) by mouth once daily.  Dispense: 90 tablet; Refill: 2  - Lipid Panel; Future    5. Type 2 diabetes mellitus without complication, without long-term current use of insulin  - metFORMIN (GLUCOPHAGE-XR) 500 MG ER 24hr tablet; Take 1 tablet (500 mg total) by mouth 2 (two) times daily with meals.  Dispense: 180 tablet; Refill: 2  - Hemoglobin A1C; Future  - Comprehensive Metabolic Panel; Future    6. Encounter for routine laboratory testing  - Hemoglobin A1C; Future  - Comprehensive Metabolic Panel; Future  - Lipid Panel; Future    7. Aortic atherosclerosis    8. Severe obesity (BMI 35.0-39.9) with comorbidity     Medicare annual wellness visit, subsequent    History of transient ischemic attack (TIA)    Episode of hypertension  Comments:  keep log call w results     Mixed hyperlipidemia  -     Discontinue: rosuvastatin (CRESTOR) 10 MG tablet; Take 1 tablet (10 mg total) by mouth once daily.  Dispense: 90 tablet; Refill: 2  -     rosuvastatin (CRESTOR) 5 MG tablet; Take 1 tablet (5 mg total) by mouth once daily.  Dispense: 90 tablet; Refill: 2  -     Lipid Panel; Future; Expected date: 12/08/2022    Type 2 diabetes mellitus without complication, without long-term current use of insulin  -     metFORMIN (GLUCOPHAGE-XR) 500 MG ER 24hr tablet; Take 1 tablet (500 mg total) by mouth 2 (two) times daily with meals.  Dispense: 180 tablet; Refill: 2  -     Hemoglobin A1C; Future; Expected date: 12/08/2022  -     Comprehensive Metabolic Panel; Future; Expected date: 12/08/2022    Encounter for routine laboratory testing  -     Hemoglobin A1C; Future; Expected date: 12/08/2022  -     Comprehensive Metabolic Panel; Future; Expected date: 12/08/2022  -     Lipid Panel; Future; Expected date: 12/08/2022    Aortic atherosclerosis    Severe obesity  (BMI 35.0-39.9) with comorbidity        Continue to work on regular exercise, maintain healthy weight, balanced diet. Avoid unhealthy habits: smoking, excessive alcohol intake.

## 2023-03-09 ENCOUNTER — TELEPHONE (OUTPATIENT)
Dept: FAMILY MEDICINE | Facility: CLINIC | Age: 83
End: 2023-03-09
Payer: MEDICARE

## 2023-03-09 NOTE — TELEPHONE ENCOUNTER
Pt has had all booster vaccines and been a year so she is asking if she is needing to another Covid booster. See that according to her chart she is up to date. She is wanting your advice

## 2023-03-09 NOTE — TELEPHONE ENCOUNTER
----- Message from Belgica Pan sent at 3/8/2023  9:59 AM CST -----  Contact: Patient  Type:  Patient Call          Who Called: Patient         Does the patient know what this is regarding?: requesting a call back with questions if she should have another booster vaccine ; please advise           Would the patient rather a call back or a response via [facility]? Call           Best Call Back Number: [phone]             Additional Information:

## 2023-03-13 PROBLEM — I34.0 MITRAL VALVE REGURGITATION: Status: ACTIVE | Noted: 2019-09-23

## 2023-03-13 PROBLEM — N20.1 URETEROLITHIASIS: Status: ACTIVE | Noted: 2023-03-13

## 2023-03-13 PROBLEM — I25.10 CORONARY ARTERIOSCLEROSIS: Status: ACTIVE | Noted: 2019-09-23

## 2023-03-13 PROBLEM — G45.9 TRANSIENT ISCHEMIC ATTACK: Status: ACTIVE | Noted: 2022-09-27

## 2023-03-14 ENCOUNTER — OFFICE VISIT (OUTPATIENT)
Dept: FAMILY MEDICINE | Facility: CLINIC | Age: 83
End: 2023-03-14
Payer: MEDICARE

## 2023-03-14 VITALS
WEIGHT: 190.69 LBS | DIASTOLIC BLOOD PRESSURE: 74 MMHG | SYSTOLIC BLOOD PRESSURE: 138 MMHG | BODY MASS INDEX: 35.09 KG/M2 | TEMPERATURE: 99 F | HEIGHT: 62 IN

## 2023-03-14 DIAGNOSIS — N30.00 ACUTE INFECTIVE CYSTITIS: Primary | ICD-10-CM

## 2023-03-14 DIAGNOSIS — R30.0 DYSURIA: ICD-10-CM

## 2023-03-14 PROCEDURE — 3075F SYST BP GE 130 - 139MM HG: CPT | Mod: HCNC,CPTII,S$GLB,

## 2023-03-14 PROCEDURE — 87086 URINE CULTURE/COLONY COUNT: CPT | Mod: HCNC

## 2023-03-14 PROCEDURE — 3078F DIAST BP <80 MM HG: CPT | Mod: HCNC,CPTII,S$GLB,

## 2023-03-14 PROCEDURE — 1125F PR PAIN SEVERITY QUANTIFIED, PAIN PRESENT: ICD-10-PCS | Mod: HCNC,CPTII,S$GLB,

## 2023-03-14 PROCEDURE — 99999 PR PBB SHADOW E&M-EST. PATIENT-LVL III: CPT | Mod: PBBFAC,HCNC,,

## 2023-03-14 PROCEDURE — 1159F MED LIST DOCD IN RCRD: CPT | Mod: HCNC,CPTII,S$GLB,

## 2023-03-14 PROCEDURE — 1125F AMNT PAIN NOTED PAIN PRSNT: CPT | Mod: HCNC,CPTII,S$GLB,

## 2023-03-14 PROCEDURE — 3288F PR FALLS RISK ASSESSMENT DOCUMENTED: ICD-10-PCS | Mod: HCNC,CPTII,S$GLB,

## 2023-03-14 PROCEDURE — 87186 SC STD MICRODIL/AGAR DIL: CPT | Mod: HCNC

## 2023-03-14 PROCEDURE — 87088 URINE BACTERIA CULTURE: CPT | Mod: HCNC

## 2023-03-14 PROCEDURE — 99213 PR OFFICE/OUTPT VISIT, EST, LEVL III, 20-29 MIN: ICD-10-PCS | Mod: HCNC,S$GLB,,

## 2023-03-14 PROCEDURE — 99213 OFFICE O/P EST LOW 20 MIN: CPT | Mod: HCNC,S$GLB,,

## 2023-03-14 PROCEDURE — 1101F PT FALLS ASSESS-DOCD LE1/YR: CPT | Mod: HCNC,CPTII,S$GLB,

## 2023-03-14 PROCEDURE — 99999 PR PBB SHADOW E&M-EST. PATIENT-LVL III: ICD-10-PCS | Mod: PBBFAC,HCNC,,

## 2023-03-14 PROCEDURE — 3075F PR MOST RECENT SYSTOLIC BLOOD PRESS GE 130-139MM HG: ICD-10-PCS | Mod: HCNC,CPTII,S$GLB,

## 2023-03-14 PROCEDURE — 1159F PR MEDICATION LIST DOCUMENTED IN MEDICAL RECORD: ICD-10-PCS | Mod: HCNC,CPTII,S$GLB,

## 2023-03-14 PROCEDURE — 87077 CULTURE AEROBIC IDENTIFY: CPT | Mod: HCNC

## 2023-03-14 PROCEDURE — 81001 URINALYSIS AUTO W/SCOPE: CPT | Mod: HCNC

## 2023-03-14 PROCEDURE — 3288F FALL RISK ASSESSMENT DOCD: CPT | Mod: HCNC,CPTII,S$GLB,

## 2023-03-14 PROCEDURE — 3078F PR MOST RECENT DIASTOLIC BLOOD PRESSURE < 80 MM HG: ICD-10-PCS | Mod: HCNC,CPTII,S$GLB,

## 2023-03-14 PROCEDURE — 1101F PR PT FALLS ASSESS DOC 0-1 FALLS W/OUT INJ PAST YR: ICD-10-PCS | Mod: HCNC,CPTII,S$GLB,

## 2023-03-14 RX ORDER — CEPHALEXIN 500 MG/1
500 CAPSULE ORAL 4 TIMES DAILY
Qty: 20 CAPSULE | Refills: 0 | Status: CANCELLED | OUTPATIENT
Start: 2023-03-14 | End: 2023-03-19

## 2023-03-14 NOTE — PROGRESS NOTES
[facility]  [address]  [place] [address]    Subjective    Chief Complaint:   Chief Complaint   Patient presents with    Urinary Tract Infection     C/o frequency       History of Present Illness:     Jacqui Luther is a(n) 82 y.o. female with past medical history as noted below who presents to the clinic today for 1 week onset urinary urgency and frequency.     Associated symptoms include chills. She denies fever, hematuria, dysuria, back pain, hematuria, confusion. She has never had a bladder infection in her life. No abdominal pain, nausea or vomiting. History kidney stones, none in 3 years.       Problem List:   Patient Active Problem List   Diagnosis    Status post total knee replacement, right    Decreased ROM of right knee    Aortic atherosclerosis    Kidney cysts    DDD (degenerative disc disease), lumbar    Glucose intolerance (impaired glucose tolerance)    Adrenal adenoma, left    Mixed hyperlipidemia    Severe obesity (BMI 35.0-39.9) with comorbidity    Vitamin D deficiency    Type 2 diabetes mellitus without complication, without long-term current use of insulin    Elevated liver enzymes    Coronary arteriosclerosis    Mitral valve regurgitation    Transient ischemic attack    Ureterolithiasis       Current Outpatient Medications:   Current Outpatient Medications   Medication Instructions    amoxicillin (AMOXIL) 500 MG capsule amoxicillin 500 mg capsule   TAKE FOUR CAPSULES BY MOUTH 30 60 MINUTES BEFORE DENTAL WORK    aspirin 81 mg, Oral, Once    clopidogreL (PLAVIX) 75 mg tablet No dose, route, or frequency recorded.    coenzyme Q10 200 mg, Oral, Daily    fish,bora,flax oils-om3,6,9 #1 1,200 mg Cap Oral, 3 capsules daily     glucosamine/chondr shaw A sod (OSTEO BI-FLEX ORAL) 2 capsules, Oral, Daily    metFORMIN (GLUCOPHAGE-XR) 500 mg, Oral, 2 times daily with meals    multivitamin with minerals tablet 1 tablet, Oral, Daily    rosuvastatin  "(CRESTOR) 5 mg, Oral, Daily    turmeric (CURCUMIN MISC) 1 capsule, Misc.(Non-Drug; Combo Route)       Surgical History:   Past Surgical History:   Procedure Laterality Date    CATARACT EXTRACTION Bilateral     Dr Zamorano      SECTION  1973    x 1    CHOLECYSTECTOMY  2008    [place]    removal of breast tissue (axillary)  2009    benign    removal of colon polyps      2020 - r/c 3 yrs Dr Callahan     TOTAL KNEE ARTHROPLASTY Right 02/15/2021       Family History:   Family History   Problem Relation Age of Onset    Nephrolithiasis Unknown         father, brother    Cancer Maternal Grandfather         colon rectal     Cancer Maternal Aunt         colon cancer     Cancer Maternal Cousin         colon     Cancer Maternal Cousin         colon     Kidney nephrosis Brother     Heart disease Brother         stents        Allergies: Review of patient's allergies indicates:  No Known Allergies    Tobacco Status:   Tobacco Use: Medium Risk    Smoking Tobacco Use: Never    Smokeless Tobacco Use: Never    Passive Exposure: Yes       Sexual Activity:   Social History     Substance and Sexual Activity   Sexual Activity Not Currently    Partners: Male    Comment:        Alcohol Use:   Social History     Substance and Sexual Activity   Alcohol Use Yes    Alcohol/week: 0.0 standard drinks         Objective       Vitals:    23 1413   BP: 138/74   BP Location: Right arm   Weight: 86.5 kg (190 lb 11.2 oz)   Height: 5' 2" (1.575 m)       Review of Systems   Constitutional:  Positive for chills. Negative for fever.   Genitourinary:  Positive for frequency and urgency. Negative for dysuria, flank pain and hematuria.   Musculoskeletal:  Negative for back pain.     Physical Exam  Constitutional:       General: She is not in acute distress.     Appearance: Normal appearance. She is not ill-appearing.   HENT:      Head: Normocephalic and atraumatic.   Cardiovascular:      Rate and Rhythm: Normal rate and regular rhythm.      " Heart sounds: Normal heart sounds. No murmur heard.  Pulmonary:      Effort: Pulmonary effort is normal. No respiratory distress.      Breath sounds: Normal breath sounds. No wheezing.   Abdominal:      Tenderness: There is no right CVA tenderness or left CVA tenderness.   Musculoskeletal:      Cervical back: Normal range of motion.   Skin:     General: Skin is warm.   Neurological:      Mental Status: She is alert and oriented to person, place, and time.   Psychiatric:         Behavior: Behavior normal.     Component      Latest Ref Rng & Units 3/14/2023   Specimen UA       Urine, Clean Catch   Color, UA      Yellow, Straw, Heather Yellow   Appearance, UA      Clear Hazy (A)   pH, UA      5.0 - 8.0 6.0   Specific Gravity, UA      1.005 - 1.030 1.015   Protein, UA      Negative Negative   Glucose, UA      Negative Negative   Ketones, UA      Negative Negative   Bilirubin (UA)      Negative Negative   Occult Blood UA      Negative Negative   NITRITE UA      Negative Negative   Leukocytes, UA      Negative 3+ (A)     Component      Latest Ref Rng & Units 3/14/2023   RBC, UA      0 - 4 /hpf 9 (H)   WBC, UA      0 - 5 /hpf >100 (H)   WBC Clumps, UA      None-Rare Many (A)   Bacteria, UA      None-Occ /hpf Many (A)   Non-Squam Epith      <1/hpf /hpf 5 (A)   Microscopic Comment       SEE COMMENT       Assessment and Plan:    1. Acute infective cystitis  -     cephALEXin (KEFLEX) 500 MG capsule; Take 1 capsule (500 mg total) by mouth 4 (four) times daily. for 5 days  Dispense: 20 capsule; Refill: 0    2. Dysuria  -     Urinalysis, Reflex to Urine Culture Urine, Clean Catch; Future; Expected date: 03/14/2023    Other orders  -     Urinalysis Microscopic  -     Urine culture        Visit summary:    Jacqui Luther presented today for urinary urgency and frequency, UA consistent with acute infectious cystitis. Urine culture pending.    Called patient on the phone to notify UA results. Prescribed Keflex 500 mg q 4 hours x  5 days. Will notify patient of culture results    Instructed to follow up if symptoms do not improve on antibiotic therapy.     Request outside records -- [facility] (Dr. Leger)     Patient was instructed to report to ER if symptoms become severe.    Follow up: No follow-ups on file.      Kalina Madera PA-C

## 2023-03-15 LAB
BACTERIA #/AREA URNS AUTO: ABNORMAL /HPF
BILIRUB UR QL STRIP: NEGATIVE
CLARITY UR REFRACT.AUTO: ABNORMAL
COLOR UR AUTO: YELLOW
GLUCOSE UR QL STRIP: NEGATIVE
HGB UR QL STRIP: NEGATIVE
KETONES UR QL STRIP: NEGATIVE
LEUKOCYTE ESTERASE UR QL STRIP: ABNORMAL
MICROSCOPIC COMMENT: ABNORMAL
NITRITE UR QL STRIP: NEGATIVE
NON-SQ EPI CELLS #/AREA URNS AUTO: 5 /HPF
PH UR STRIP: 6 [PH] (ref 5–8)
PROT UR QL STRIP: NEGATIVE
RBC #/AREA URNS AUTO: 9 /HPF (ref 0–4)
SP GR UR STRIP: 1.01 (ref 1–1.03)
URN SPEC COLLECT METH UR: ABNORMAL
WBC #/AREA URNS AUTO: >100 /HPF (ref 0–5)
WBC CLUMPS UR QL AUTO: ABNORMAL

## 2023-03-15 RX ORDER — CEPHALEXIN 500 MG/1
500 CAPSULE ORAL 4 TIMES DAILY
Qty: 20 CAPSULE | Refills: 0 | Status: SHIPPED | OUTPATIENT
Start: 2023-03-15 | End: 2023-03-20

## 2023-03-17 LAB — BACTERIA UR CULT: ABNORMAL

## 2023-03-28 ENCOUNTER — TELEPHONE (OUTPATIENT)
Dept: FAMILY MEDICINE | Facility: CLINIC | Age: 83
End: 2023-03-28
Payer: MEDICARE

## 2023-03-28 NOTE — TELEPHONE ENCOUNTER
----- Message from Kalina Madera PA-C sent at 3/20/2023  8:19 AM CDT -----  Please call patient and let her know urine culture came back, the antibiotic prescribed for her UTI should cover the infection. Please have her come back for a visit if symptoms have not resolved     Thanks!  Kalina Madera PA-C

## 2023-06-12 PROBLEM — G45.9 TRANSIENT ISCHEMIC ATTACK: Status: RESOLVED | Noted: 2022-09-27 | Resolved: 2023-06-12

## 2023-06-22 ENCOUNTER — LAB VISIT (OUTPATIENT)
Dept: LAB | Facility: HOSPITAL | Age: 83
End: 2023-06-22
Attending: INTERNAL MEDICINE
Payer: MEDICARE

## 2023-06-22 DIAGNOSIS — E11.9 TYPE 2 DIABETES MELLITUS WITHOUT COMPLICATION, WITHOUT LONG-TERM CURRENT USE OF INSULIN: ICD-10-CM

## 2023-06-22 DIAGNOSIS — Z01.89 ENCOUNTER FOR ROUTINE LABORATORY TESTING: ICD-10-CM

## 2023-06-22 DIAGNOSIS — E78.2 MIXED HYPERLIPIDEMIA: ICD-10-CM

## 2023-06-22 LAB
ALBUMIN SERPL BCP-MCNC: 4.1 G/DL (ref 3.5–5.2)
ALP SERPL-CCNC: 59 U/L (ref 55–135)
ALT SERPL W/O P-5'-P-CCNC: 88 U/L (ref 10–44)
ANION GAP SERPL CALC-SCNC: 10 MMOL/L (ref 8–16)
AST SERPL-CCNC: 60 U/L (ref 10–40)
BILIRUB SERPL-MCNC: 0.8 MG/DL (ref 0.1–1)
BUN SERPL-MCNC: 17 MG/DL (ref 8–23)
CALCIUM SERPL-MCNC: 10.2 MG/DL (ref 8.7–10.5)
CHLORIDE SERPL-SCNC: 107 MMOL/L (ref 95–110)
CHOLEST SERPL-MCNC: 168 MG/DL (ref 120–199)
CHOLEST/HDLC SERPL: 2.8 {RATIO} (ref 2–5)
CO2 SERPL-SCNC: 25 MMOL/L (ref 23–29)
CREAT SERPL-MCNC: 1 MG/DL (ref 0.5–1.4)
EST. GFR  (NO RACE VARIABLE): 56.6 ML/MIN/1.73 M^2
ESTIMATED AVG GLUCOSE: 160 MG/DL (ref 68–131)
GLUCOSE SERPL-MCNC: 149 MG/DL (ref 70–110)
HBA1C MFR BLD: 7.2 % (ref 4–5.6)
HDLC SERPL-MCNC: 61 MG/DL (ref 40–75)
HDLC SERPL: 36.3 % (ref 20–50)
LDLC SERPL CALC-MCNC: 81.2 MG/DL (ref 63–159)
NONHDLC SERPL-MCNC: 107 MG/DL
POTASSIUM SERPL-SCNC: 5.3 MMOL/L (ref 3.5–5.1)
PROT SERPL-MCNC: 7.1 G/DL (ref 6–8.4)
SODIUM SERPL-SCNC: 142 MMOL/L (ref 136–145)
TRIGL SERPL-MCNC: 129 MG/DL (ref 30–150)

## 2023-06-22 PROCEDURE — 80053 COMPREHEN METABOLIC PANEL: CPT | Performed by: INTERNAL MEDICINE

## 2023-06-22 PROCEDURE — 83036 HEMOGLOBIN GLYCOSYLATED A1C: CPT | Performed by: INTERNAL MEDICINE

## 2023-06-22 PROCEDURE — 36415 COLL VENOUS BLD VENIPUNCTURE: CPT | Mod: PO | Performed by: INTERNAL MEDICINE

## 2023-06-22 PROCEDURE — 80061 LIPID PANEL: CPT | Performed by: INTERNAL MEDICINE

## 2023-06-29 ENCOUNTER — OFFICE VISIT (OUTPATIENT)
Dept: FAMILY MEDICINE | Facility: CLINIC | Age: 83
End: 2023-06-29
Payer: MEDICARE

## 2023-06-29 VITALS
HEART RATE: 75 BPM | HEIGHT: 62 IN | SYSTOLIC BLOOD PRESSURE: 132 MMHG | DIASTOLIC BLOOD PRESSURE: 68 MMHG | BODY MASS INDEX: 34.53 KG/M2 | OXYGEN SATURATION: 96 % | WEIGHT: 187.63 LBS

## 2023-06-29 DIAGNOSIS — Z12.31 VISIT FOR SCREENING MAMMOGRAM: ICD-10-CM

## 2023-06-29 DIAGNOSIS — E78.2 MIXED HYPERLIPIDEMIA: ICD-10-CM

## 2023-06-29 DIAGNOSIS — R74.8 ELEVATED LIVER ENZYMES: ICD-10-CM

## 2023-06-29 DIAGNOSIS — R79.89 ABNORMAL CBC: ICD-10-CM

## 2023-06-29 DIAGNOSIS — I70.0 AORTIC ATHEROSCLEROSIS: ICD-10-CM

## 2023-06-29 DIAGNOSIS — E11.9 TYPE 2 DIABETES MELLITUS WITHOUT COMPLICATION, WITHOUT LONG-TERM CURRENT USE OF INSULIN: ICD-10-CM

## 2023-06-29 DIAGNOSIS — E66.01 SEVERE OBESITY (BMI 35.0-39.9) WITH COMORBIDITY: ICD-10-CM

## 2023-06-29 DIAGNOSIS — Z00.00 MEDICARE ANNUAL WELLNESS VISIT, SUBSEQUENT: Primary | ICD-10-CM

## 2023-06-29 PROCEDURE — 3075F SYST BP GE 130 - 139MM HG: CPT | Mod: CPTII,S$GLB,, | Performed by: INTERNAL MEDICINE

## 2023-06-29 PROCEDURE — 3288F PR FALLS RISK ASSESSMENT DOCUMENTED: ICD-10-PCS | Mod: CPTII,S$GLB,, | Performed by: INTERNAL MEDICINE

## 2023-06-29 PROCEDURE — 99499 RISK ADDL DX/OHS AUDIT: ICD-10-PCS | Mod: HCNC,S$GLB,, | Performed by: INTERNAL MEDICINE

## 2023-06-29 PROCEDURE — 3075F PR MOST RECENT SYSTOLIC BLOOD PRESS GE 130-139MM HG: ICD-10-PCS | Mod: CPTII,S$GLB,, | Performed by: INTERNAL MEDICINE

## 2023-06-29 PROCEDURE — 1160F PR REVIEW ALL MEDS BY PRESCRIBER/CLIN PHARMACIST DOCUMENTED: ICD-10-PCS | Mod: CPTII,S$GLB,, | Performed by: INTERNAL MEDICINE

## 2023-06-29 PROCEDURE — 99499 UNLISTED E&M SERVICE: CPT | Mod: HCNC,S$GLB,, | Performed by: INTERNAL MEDICINE

## 2023-06-29 PROCEDURE — 1126F PR PAIN SEVERITY QUANTIFIED, NO PAIN PRESENT: ICD-10-PCS | Mod: CPTII,S$GLB,, | Performed by: INTERNAL MEDICINE

## 2023-06-29 PROCEDURE — 1159F MED LIST DOCD IN RCRD: CPT | Mod: CPTII,S$GLB,, | Performed by: INTERNAL MEDICINE

## 2023-06-29 PROCEDURE — 1126F AMNT PAIN NOTED NONE PRSNT: CPT | Mod: CPTII,S$GLB,, | Performed by: INTERNAL MEDICINE

## 2023-06-29 PROCEDURE — 1101F PT FALLS ASSESS-DOCD LE1/YR: CPT | Mod: CPTII,S$GLB,, | Performed by: INTERNAL MEDICINE

## 2023-06-29 PROCEDURE — 1160F RVW MEDS BY RX/DR IN RCRD: CPT | Mod: CPTII,S$GLB,, | Performed by: INTERNAL MEDICINE

## 2023-06-29 PROCEDURE — 99999 PR PBB SHADOW E&M-EST. PATIENT-LVL IV: CPT | Mod: PBBFAC,,, | Performed by: INTERNAL MEDICINE

## 2023-06-29 PROCEDURE — 3288F FALL RISK ASSESSMENT DOCD: CPT | Mod: CPTII,S$GLB,, | Performed by: INTERNAL MEDICINE

## 2023-06-29 PROCEDURE — 1159F PR MEDICATION LIST DOCUMENTED IN MEDICAL RECORD: ICD-10-PCS | Mod: CPTII,S$GLB,, | Performed by: INTERNAL MEDICINE

## 2023-06-29 PROCEDURE — 99999 PR PBB SHADOW E&M-EST. PATIENT-LVL IV: ICD-10-PCS | Mod: PBBFAC,,, | Performed by: INTERNAL MEDICINE

## 2023-06-29 PROCEDURE — 3078F DIAST BP <80 MM HG: CPT | Mod: CPTII,S$GLB,, | Performed by: INTERNAL MEDICINE

## 2023-06-29 PROCEDURE — 1101F PR PT FALLS ASSESS DOC 0-1 FALLS W/OUT INJ PAST YR: ICD-10-PCS | Mod: CPTII,S$GLB,, | Performed by: INTERNAL MEDICINE

## 2023-06-29 PROCEDURE — 99214 PR OFFICE/OUTPT VISIT, EST, LEVL IV, 30-39 MIN: ICD-10-PCS | Mod: S$GLB,,, | Performed by: INTERNAL MEDICINE

## 2023-06-29 PROCEDURE — 3078F PR MOST RECENT DIASTOLIC BLOOD PRESSURE < 80 MM HG: ICD-10-PCS | Mod: CPTII,S$GLB,, | Performed by: INTERNAL MEDICINE

## 2023-06-29 PROCEDURE — 99214 OFFICE O/P EST MOD 30 MIN: CPT | Mod: S$GLB,,, | Performed by: INTERNAL MEDICINE

## 2023-06-29 RX ORDER — CARBOXYMETHYLCELLULOSE SODIUM 5 MG/ML
1 SOLUTION/ DROPS OPHTHALMIC 3 TIMES DAILY PRN
COMMUNITY

## 2023-06-29 NOTE — PROGRESS NOTES
Subjective:       Patient ID: Jacqui Luther is a 82 y.o. female.    Chief Complaint: Follow-up (6 month lab work)   Follow-up  Pertinent negatives include no chest pain or joint swelling.        Gyn: no defer age   MMG:  LV 11/2022  Dexa: yes LV 2019 normal   Colonoscopy:  Dr Callahan 8/2020 polyp/ r/c as needed   Immunizations: Flu: Y Tdap: 2022 Pneumovax: 2021  Prevnar 13: 2020  Shingles: old Covid: yes   Smoker: no   Eye: Dr Zamorano 5/2022 + dry eye.     K 5.3  // ALT 88 AST 60   Reviewed bp log   Episode HTN:  monitor bring log.  Goal due to TIA and DM- to be < 130       TIA: admit LV. Witnessed. Seen Neuro [facility]. S/p Emg BLE and mild numb lat feet. Recommend prn return.        Loop recorder 4/2023 to see if having episodes of A fib - none  yet. Cardio thinks she has it.     Type 2 Diabetes:  stable reviewed home log. //  A1c 7.2 prev 7.5 G 149 // Rx: Metformin  twice daily       HLD:  LDL goal <70// improved L 81. //  Rx Crestor 5     Elevated cyclic LFT: AST/AL 60/88     Atherosclerosis arteries: Dr Hurley     Review CT 2019 shows benign left adrenal adenoma, B kidney cyst.     ____________________________________________________________________________________________________  Assessment & Plan:  1. Type 2 diabetes mellitus without complication, without long-term current use of insulin  - Comprehensive Metabolic Panel; Future  - Hemoglobin A1C; Future  - metFORMIN (GLUCOPHAGE-XR) 500 MG ER 24hr tablet; Take 1 tablet (500 mg total) by mouth 2 (two) times daily with meals.  Dispense: 180 tablet; Refill: 2    2. Severe obesity (BMI 35.0-39.9) with comorbidity    3. Aortic atherosclerosis    4. Medicare annual wellness visit, subsequent  - CBC Without Differential; Future  - Comprehensive Metabolic Panel; Future  - Hemoglobin A1C; Future  - Lipid Panel; Future    5. Abnormal CBC  - CBC Without Differential; Future    6. Elevated liver enzymes    7. Mixed hyperlipidemia  - Lipid Panel;  Future  - rosuvastatin (CRESTOR) 5 MG tablet; Take 1 tablet (5 mg total) by mouth once daily.  Dispense: 90 tablet; Refill: 2    8. Visit for screening mammogram  - Mammo Digital Screening Bilat w/ Eddy; Future  - Mammo Digital Screening Bilat w/ Eddy    9. Type 2 diabetes mellitus without complication, without long-term current use of insulin  - Comprehensive Metabolic Panel; Future  - Hemoglobin A1C; Future  - metFORMIN (GLUCOPHAGE-XR) 500 MG ER 24hr tablet; Take 1 tablet (500 mg total) by mouth 2 (two) times daily with meals.  Dispense: 180 tablet; Refill: 2     Medicare annual wellness visit, subsequent  -     CBC Without Differential; Future; Expected date: 06/29/2023  -     Comprehensive Metabolic Panel; Future; Expected date: 06/29/2023  -     Hemoglobin A1C; Future; Expected date: 06/29/2023  -     Lipid Panel; Future; Expected date: 06/29/2023    Type 2 diabetes mellitus without complication, without long-term current use of insulin  Comments:  will due urine at office visit   Orders:  -     Comprehensive Metabolic Panel; Future; Expected date: 06/29/2023  -     Hemoglobin A1C; Future; Expected date: 06/29/2023  -     metFORMIN (GLUCOPHAGE-XR) 500 MG ER 24hr tablet; Take 1 tablet (500 mg total) by mouth 2 (two) times daily with meals.  Dispense: 180 tablet; Refill: 2    Severe obesity (BMI 35.0-39.9) with comorbidity    Aortic atherosclerosis    Abnormal CBC  -     CBC Without Differential; Future; Expected date: 06/29/2023    Elevated liver enzymes    Mixed hyperlipidemia  -     Lipid Panel; Future; Expected date: 06/29/2023  -     rosuvastatin (CRESTOR) 5 MG tablet; Take 1 tablet (5 mg total) by mouth once daily.  Dispense: 90 tablet; Refill: 2    Visit for screening mammogram  -     Mammo Digital Screening Bilat w/ Eddy; Future; Expected date: 06/29/2023    Type 2 diabetes mellitus without complication, without long-term current use of insulin  -     Comprehensive Metabolic Panel; Future; Expected date:  06/29/2023  -     Hemoglobin A1C; Future; Expected date: 06/29/2023  -     metFORMIN (GLUCOPHAGE-XR) 500 MG ER 24hr tablet; Take 1 tablet (500 mg total) by mouth 2 (two) times daily with meals.  Dispense: 180 tablet; Refill: 2        Continue to work on regular exercise, maintain healthy weight, balanced diet. Avoid unhealthy habits: smoking, excessive alcohol intake.     Disclaimer: This note was partly generated using dictation software which may occasionally result in transcription errors  ____________________________________________________________________________________________________  Review of Systems:  Review of Systems   Constitutional:  Negative for appetite change.   HENT:  Negative for nosebleeds.    Eyes:  Negative for pain.   Respiratory:  Negative for choking.    Cardiovascular:  Negative for chest pain.   Gastrointestinal:  Negative for blood in stool.   Genitourinary:  Negative for hematuria.   Musculoskeletal:  Negative for joint swelling.   Skin:  Negative for pallor.   Neurological:  Negative for facial asymmetry.   Hematological:  Does not bruise/bleed easily.   Psychiatric/Behavioral:  Negative for confusion.      Objective:     Wt Readings from Last 3 Encounters:   06/29/23 85.1 kg (187 lb 9.8 oz)   03/14/23 86.5 kg (190 lb 11.2 oz)   12/08/22 87.4 kg (192 lb 9.2 oz)     BP Readings from Last 3 Encounters:   06/29/23 132/68   03/14/23 138/74   12/08/22 (!) 142/72       Lab Results   Component Value Date    WBC 5.9 05/25/2022    HGB 13.5 05/25/2022    HCT 41.8 05/25/2022     05/25/2022     06/22/2023    K 5.3 (H) 06/22/2023     06/22/2023    ALT 88 (H) 06/22/2023    AST 60 (H) 06/22/2023    CO2 25 06/22/2023    CREATININE 1.0 06/22/2023    BUN 17 06/22/2023     (H) 06/22/2023      Hemoglobin A1C   Date Value Ref Range Status   06/22/2023 7.2 (H) 4.0 - 5.6 % Final     Comment:     ADA Screening Guidelines:  5.7-6.4%  Consistent with prediabetes  >or=6.5%  Consistent  with diabetes    High levels of fetal hemoglobin interfere with the HbA1C  assay. Heterozygous hemoglobin variants (HbS, HgC, etc)do  not significantly interfere with this assay.   However, presence of multiple variants may affect accuracy.     12/02/2022 7.2 (H) 4.0 - 5.6 % Final     Comment:     ADA Screening Guidelines:  5.7-6.4%  Consistent with prediabetes  >or=6.5%  Consistent with diabetes    High levels of fetal hemoglobin interfere with the HbA1C  assay. Heterozygous hemoglobin variants (HbS, HgC, etc)do  not significantly interfere with this assay.   However, presence of multiple variants may affect accuracy.     05/25/2022 7.5 (H) <5.7 % of total Hgb Final     Comment:     For someone without known diabetes, a hemoglobin A1c  value of 6.5% or greater indicates that they may have   diabetes and this should be confirmed with a follow-up   test.     For someone with known diabetes, a value <7% indicates   that their diabetes is well controlled and a value   greater than or equal to 7% indicates suboptimal   control. A1c targets should be individualized based on   duration of diabetes, age, comorbid conditions, and   other considerations.     Currently, no consensus exists regarding use of  hemoglobin A1c for diagnosis of diabetes for children.            Lab Results   Component Value Date    TSH 1.66 05/25/2022     No results found for: FREET4  Lab Results   Component Value Date    LDLCALC 81.2 06/22/2023    LDLCALC 101.6 12/02/2022    LDLCALC 105 (H) 05/25/2022     Lab Results   Component Value Date    TRIG 129 06/22/2023    TRIG 112 12/02/2022    TRIG 117 05/25/2022            Physical Exam  Constitutional:       Appearance: Normal appearance.   HENT:      Head: Normocephalic and atraumatic.   Eyes:      Extraocular Movements: Extraocular movements intact.      Pupils: Pupils are equal, round, and reactive to light.   Cardiovascular:      Rate and Rhythm: Normal rate.   Pulmonary:      Effort: Pulmonary  effort is normal.   Neurological:      Mental Status: She is alert and oriented to person, place, and time.   Psychiatric:         Mood and Affect: Mood normal.       Medication List with Changes/Refills   Current Medications    AMOXICILLIN (AMOXIL) 500 MG CAPSULE    amoxicillin 500 mg capsule   TAKE FOUR CAPSULES BY MOUTH 30 60 MINUTES BEFORE DENTAL WORK    ASPIRIN 81 MG CAP    Take 81 mg by mouth once.    CARBOXYMETHYLCELLULOSE (REFRESH PLUS) 0.5 % DPET    1 drop 3 (three) times daily as needed.    CLOPIDOGREL (PLAVIX) 75 MG TABLET        COENZYME Q10 200 MG CAPSULE    Take 200 mg by mouth once daily.    FISH,BORA,FLAX OILS-OM3,6,9 #1 1,200 MG CAP    Take by mouth. 3 capsules daily    GLUCOSAMINE/CHONDR FULLER A SOD (OSTEO BI-FLEX ORAL)    Take 2 capsules by mouth once daily.    MULTIVITAMIN WITH MINERALS TABLET    Take 1 tablet by mouth once daily.    TURMERIC (CURCUMIN MISC)    1 capsule by Misc.(Non-Drug; Combo Route) route.   Changed and/or Refilled Medications    Modified Medication Previous Medication    METFORMIN (GLUCOPHAGE-XR) 500 MG ER 24HR TABLET metFORMIN (GLUCOPHAGE-XR) 500 MG ER 24hr tablet       Take 1 tablet (500 mg total) by mouth 2 (two) times daily with meals.    Take 1 tablet (500 mg total) by mouth 2 (two) times daily with meals.    ROSUVASTATIN (CRESTOR) 5 MG TABLET rosuvastatin (CRESTOR) 5 MG tablet       Take 1 tablet (5 mg total) by mouth once daily.    Take 1 tablet (5 mg total) by mouth once daily.

## 2023-07-07 RX ORDER — METFORMIN HYDROCHLORIDE 500 MG/1
500 TABLET, EXTENDED RELEASE ORAL 2 TIMES DAILY WITH MEALS
Qty: 180 TABLET | Refills: 2 | Status: SHIPPED | OUTPATIENT
Start: 2023-07-07 | End: 2023-09-26 | Stop reason: SDUPTHER

## 2023-07-07 RX ORDER — ROSUVASTATIN CALCIUM 5 MG/1
5 TABLET, COATED ORAL DAILY
Qty: 90 TABLET | Refills: 2 | Status: SHIPPED | OUTPATIENT
Start: 2023-07-07

## 2023-09-20 DIAGNOSIS — Z78.0 MENOPAUSE: ICD-10-CM

## 2023-09-25 ENCOUNTER — TELEPHONE (OUTPATIENT)
Dept: FAMILY MEDICINE | Facility: CLINIC | Age: 83
End: 2023-09-25
Payer: MEDICARE

## 2023-09-25 NOTE — TELEPHONE ENCOUNTER
----- Message from Annita Aragon sent at 9/25/2023 11:21 AM CDT -----  Regarding: Same Day Appt  Type:  Same Day Appointment Request    Caller is requesting a same day appointment.  Caller declined first available appointment listed below.      Name of Caller:  pt   When is the first available appointment?  unk  Symptoms: 3 middle toes are black towards the foot   Best Call Back Number:  [phone]            Additional Information:  requesting a call back and appt asap  please advise thank you

## 2023-09-26 ENCOUNTER — HOSPITAL ENCOUNTER (OUTPATIENT)
Dept: RADIOLOGY | Facility: HOSPITAL | Age: 83
Discharge: HOME | End: 2023-09-26
Attending: INTERNAL MEDICINE
Payer: MEDICARE

## 2023-09-26 ENCOUNTER — OFFICE VISIT (OUTPATIENT)
Dept: FAMILY MEDICINE | Facility: CLINIC | Age: 83
End: 2023-09-26
Payer: MEDICARE

## 2023-09-26 DIAGNOSIS — M79.672 FOOT PAIN, LEFT: Primary | ICD-10-CM

## 2023-09-26 DIAGNOSIS — M79.672 FOOT PAIN, LEFT: ICD-10-CM

## 2023-09-26 DIAGNOSIS — S90.32XA CONTUSION OF LEFT FOOT, INITIAL ENCOUNTER: ICD-10-CM

## 2023-09-26 DIAGNOSIS — E11.9 TYPE 2 DIABETES MELLITUS WITHOUT COMPLICATION, WITHOUT LONG-TERM CURRENT USE OF INSULIN: ICD-10-CM

## 2023-09-26 DIAGNOSIS — R03.0 WHITE COAT SYNDROME WITHOUT DIAGNOSIS OF HYPERTENSION: ICD-10-CM

## 2023-09-26 PROCEDURE — 1101F PR PT FALLS ASSESS DOC 0-1 FALLS W/OUT INJ PAST YR: ICD-10-PCS | Mod: HCNC,CPTII,S$GLB, | Performed by: INTERNAL MEDICINE

## 2023-09-26 PROCEDURE — 1159F MED LIST DOCD IN RCRD: CPT | Mod: HCNC,CPTII,S$GLB, | Performed by: INTERNAL MEDICINE

## 2023-09-26 PROCEDURE — 99214 PR OFFICE/OUTPT VISIT, EST, LEVL IV, 30-39 MIN: ICD-10-PCS | Mod: HCNC,S$GLB,, | Performed by: INTERNAL MEDICINE

## 2023-09-26 PROCEDURE — 3288F FALL RISK ASSESSMENT DOCD: CPT | Mod: HCNC,CPTII,S$GLB, | Performed by: INTERNAL MEDICINE

## 2023-09-26 PROCEDURE — 3078F PR MOST RECENT DIASTOLIC BLOOD PRESSURE < 80 MM HG: ICD-10-PCS | Mod: HCNC,CPTII,S$GLB, | Performed by: INTERNAL MEDICINE

## 2023-09-26 PROCEDURE — 3288F PR FALLS RISK ASSESSMENT DOCUMENTED: ICD-10-PCS | Mod: HCNC,CPTII,S$GLB, | Performed by: INTERNAL MEDICINE

## 2023-09-26 PROCEDURE — 1160F RVW MEDS BY RX/DR IN RCRD: CPT | Mod: HCNC,CPTII,S$GLB, | Performed by: INTERNAL MEDICINE

## 2023-09-26 PROCEDURE — 3078F DIAST BP <80 MM HG: CPT | Mod: HCNC,CPTII,S$GLB, | Performed by: INTERNAL MEDICINE

## 2023-09-26 PROCEDURE — 3075F SYST BP GE 130 - 139MM HG: CPT | Mod: HCNC,CPTII,S$GLB, | Performed by: INTERNAL MEDICINE

## 2023-09-26 PROCEDURE — 1160F PR REVIEW ALL MEDS BY PRESCRIBER/CLIN PHARMACIST DOCUMENTED: ICD-10-PCS | Mod: HCNC,CPTII,S$GLB, | Performed by: INTERNAL MEDICINE

## 2023-09-26 PROCEDURE — 1101F PT FALLS ASSESS-DOCD LE1/YR: CPT | Mod: HCNC,CPTII,S$GLB, | Performed by: INTERNAL MEDICINE

## 2023-09-26 PROCEDURE — 99999 PR PBB SHADOW E&M-EST. PATIENT-LVL V: ICD-10-PCS | Mod: PBBFAC,HCNC,, | Performed by: INTERNAL MEDICINE

## 2023-09-26 PROCEDURE — 99214 OFFICE O/P EST MOD 30 MIN: CPT | Mod: HCNC,S$GLB,, | Performed by: INTERNAL MEDICINE

## 2023-09-26 PROCEDURE — 73630 X-RAY EXAM OF FOOT: CPT | Mod: TC,HCNC,PN,LT

## 2023-09-26 PROCEDURE — 3075F PR MOST RECENT SYSTOLIC BLOOD PRESS GE 130-139MM HG: ICD-10-PCS | Mod: HCNC,CPTII,S$GLB, | Performed by: INTERNAL MEDICINE

## 2023-09-26 PROCEDURE — 99999 PR PBB SHADOW E&M-EST. PATIENT-LVL V: CPT | Mod: PBBFAC,HCNC,, | Performed by: INTERNAL MEDICINE

## 2023-09-26 PROCEDURE — 73630 X-RAY EXAM OF FOOT: CPT | Mod: 26,HCNC,LT, | Performed by: RADIOLOGY

## 2023-09-26 PROCEDURE — 1159F PR MEDICATION LIST DOCUMENTED IN MEDICAL RECORD: ICD-10-PCS | Mod: HCNC,CPTII,S$GLB, | Performed by: INTERNAL MEDICINE

## 2023-09-26 PROCEDURE — 73630 XR FOOT COMPLETE 3 VIEW LEFT: ICD-10-PCS | Mod: 26,HCNC,LT, | Performed by: RADIOLOGY

## 2023-09-26 RX ORDER — METFORMIN HYDROCHLORIDE 500 MG/1
1500 TABLET, EXTENDED RELEASE ORAL DAILY
Qty: 270 TABLET | Refills: 2 | Status: SHIPPED | OUTPATIENT
Start: 2023-09-26 | End: 2024-09-25

## 2023-09-26 NOTE — PROGRESS NOTES
Subjective:       Patient ID: Jacqui Luther is a 82 y.o. female.    Chief Complaint: Toe Issue (Pt complains of her toes being black/bruise- Left 2nd-4th digit, and side of foot. X 5 days /Denies trauma, fall, dropping anything on foot./Dx of DM2)   About 4 days ago - noticed bruising left foot top of toe area.  No pain no trauma to the area.  Not sore with ambulation.  Worried about diabetes causing issues    Does have slight swelling top of foot    Never had before.         Home bp log today 137/75       Gyn: no defer age   MMG:  LV 11/2022  Dexa: yes LV 2019 normal   Colonoscopy:  Dr Callahan 8/2020 polyp/ r/c as needed   Immunizations: Flu: Y Tdap: 2022 Pneumovax: 2021  Prevnar 13: 2020  Shingles: old Covid: yes   Smoker: no   Eye: Dr Zamorano 5/2022 + dry eye.         White coat hypertension     TIA: admit LV. Witnessed. Seen Neuro [facility]. S/p Emg BLE and mild numb lat feet. Recommend prn return.        Loop recorder 4/2023 to see if having episodes of A fib - none  yet. Cardio thinks she has it.   Atherosclerosis arteries: Dr Hurley     Type 2 Diabetes:  stable //  A1c 7.2 prev 7.5 G 149 // Rx: Metformin  twice daily       HLD:  LDL goal <70// improved L 81. //  Rx Crestor 5     Elevated cyclic LFT: AST/AL 60/88        Review CT 2019 shows benign left adrenal adenoma, B kidney cyst.     ____________________________________________________________________________________________________  ____________________________________________________________________________________________________  Assessment & Plan:  1. Foot pain, left  - X-Ray Foot Complete 3 view Left; Future    2. Contusion of left foot, initial encounter  - X-Ray Foot Complete 3 view Left; Future    3. Type 2 diabetes mellitus without complication, without long-term current use of insulin  - metFORMIN (GLUCOPHAGE-XR) 500 MG ER 24hr tablet; Take 3 tablets (1,500 mg total) by mouth once daily.  Dispense: 270 tablet; Refill: 2    4.  White coat syndrome without diagnosis of hypertension     Foot pain, left  -     Cancel: X-Ray Foot 2 View Left; Future; Expected date: 09/26/2023  -     X-Ray Foot Complete 3 view Left; Future; Expected date: 09/26/2023    Contusion of left foot, initial encounter  -     Cancel: X-Ray Foot 2 View Left; Future; Expected date: 09/26/2023  -     X-Ray Foot Complete 3 view Left; Future; Expected date: 09/26/2023    Type 2 diabetes mellitus without complication, without long-term current use of insulin  -     metFORMIN (GLUCOPHAGE-XR) 500 MG ER 24hr tablet; Take 3 tablets (1,500 mg total) by mouth once daily.  Dispense: 270 tablet; Refill: 2    White coat syndrome without diagnosis of hypertension        Continue to work on regular exercise, maintain healthy weight, balanced diet. Avoid unhealthy habits: smoking, excessive alcohol intake.     Disclaimer: This note was partly generated using dictation software which may occasionally result in transcription errors  ____________________________________________________________________________________________________  Review of Systems:  Review of Systems   Constitutional:  Negative for appetite change.   HENT:  Negative for nosebleeds.    Eyes:  Negative for pain.   Respiratory:  Negative for choking.    Cardiovascular:  Negative for chest pain.   Gastrointestinal:  Negative for blood in stool.   Genitourinary:  Negative for hematuria.   Musculoskeletal:  Negative for joint swelling.   Skin:  Positive for color change. Negative for pallor.   Neurological:  Negative for facial asymmetry.   Hematological:  Does not bruise/bleed easily.   Psychiatric/Behavioral:  Negative for confusion.        Objective:     Wt Readings from Last 3 Encounters:   09/26/23 86 kg (189 lb 9.5 oz)   06/29/23 85.1 kg (187 lb 9.8 oz)   03/14/23 86.5 kg (190 lb 11.2 oz)     BP Readings from Last 3 Encounters:   09/28/23 137/75   06/29/23 132/68   03/14/23 138/74       Lab Results   Component Value  "Date    WBC 5.9 05/25/2022    HGB 13.5 05/25/2022    HCT 41.8 05/25/2022     05/25/2022     06/22/2023    K 5.3 (H) 06/22/2023     06/22/2023    ALT 88 (H) 06/22/2023    AST 60 (H) 06/22/2023    CO2 25 06/22/2023    CREATININE 1.0 06/22/2023    BUN 17 06/22/2023     (H) 06/22/2023      Hemoglobin A1C   Date Value Ref Range Status   06/22/2023 7.2 (H) 4.0 - 5.6 % Final     Comment:     ADA Screening Guidelines:  5.7-6.4%  Consistent with prediabetes  >or=6.5%  Consistent with diabetes    High levels of fetal hemoglobin interfere with the HbA1C  assay. Heterozygous hemoglobin variants (HbS, HgC, etc)do  not significantly interfere with this assay.   However, presence of multiple variants may affect accuracy.     12/02/2022 7.2 (H) 4.0 - 5.6 % Final     Comment:     ADA Screening Guidelines:  5.7-6.4%  Consistent with prediabetes  >or=6.5%  Consistent with diabetes    High levels of fetal hemoglobin interfere with the HbA1C  assay. Heterozygous hemoglobin variants (HbS, HgC, etc)do  not significantly interfere with this assay.   However, presence of multiple variants may affect accuracy.     05/25/2022 7.5 (H) <5.7 % of total Hgb Final     Comment:     For someone without known diabetes, a hemoglobin A1c  value of 6.5% or greater indicates that they may have   diabetes and this should be confirmed with a follow-up   test.     For someone with known diabetes, a value <7% indicates   that their diabetes is well controlled and a value   greater than or equal to 7% indicates suboptimal   control. A1c targets should be individualized based on   duration of diabetes, age, comorbid conditions, and   other considerations.     Currently, no consensus exists regarding use of  hemoglobin A1c for diagnosis of diabetes for children.            Lab Results   Component Value Date    TSH 1.66 05/25/2022     No results found for: "FREET4"  Lab Results   Component Value Date    LDLCALC 81.2 06/22/2023    " LDLCALC 101.6 12/02/2022    LDLCALC 105 (H) 05/25/2022     Lab Results   Component Value Date    TRIG 129 06/22/2023    TRIG 112 12/02/2022    TRIG 117 05/25/2022            Physical Exam  Constitutional:       Appearance: Normal appearance.   HENT:      Head: Normocephalic and atraumatic.   Eyes:      Extraocular Movements: Extraocular movements intact.      Pupils: Pupils are equal, round, and reactive to light.   Cardiovascular:      Rate and Rhythm: Normal rate and regular rhythm.   Pulmonary:      Effort: Pulmonary effort is normal.   Musculoskeletal:      Left foot: Swelling present. No tenderness.      Comments: + bruising to 2-4 toe area and in to lat foot below malleolus.  No pain with palpitation   Neurological:      Mental Status: She is alert and oriented to person, place, and time.   Psychiatric:         Mood and Affect: Mood normal.         Medication List with Changes/Refills   Current Medications    AMOXICILLIN (AMOXIL) 500 MG CAPSULE    amoxicillin 500 mg capsule   TAKE FOUR CAPSULES BY MOUTH 30 60 MINUTES BEFORE DENTAL WORK    ASPIRIN 81 MG CAP    Take 81 mg by mouth once.    CARBOXYMETHYLCELLULOSE (REFRESH PLUS) 0.5 % DPET    1 drop 3 (three) times daily as needed.    CLOPIDOGREL (PLAVIX) 75 MG TABLET        COENZYME Q10 200 MG CAPSULE    Take 200 mg by mouth once daily.    FISH,BORA,FLAX OILS-OM3,6,9 #1 1,200 MG CAP    Take by mouth. 3 capsules daily    GLUCOSAMINE/CHONDR FULLER A SOD (OSTEO BI-FLEX ORAL)    Take 2 capsules by mouth once daily.    MULTIVITAMIN WITH MINERALS TABLET    Take 1 tablet by mouth once daily.    ROSUVASTATIN (CRESTOR) 5 MG TABLET    Take 1 tablet (5 mg total) by mouth once daily.    TURMERIC (CURCUMIN MISC)    1 capsule by Misc.(Non-Drug; Combo Route) route.   Changed and/or Refilled Medications    Modified Medication Previous Medication    METFORMIN (GLUCOPHAGE-XR) 500 MG ER 24HR TABLET metFORMIN (GLUCOPHAGE-XR) 500 MG ER 24hr tablet       Take 3 tablets (1,500 mg total) by  mouth once daily.    Take 1 tablet (500 mg total) by mouth 2 (two) times daily with meals.

## 2023-09-28 ENCOUNTER — TELEPHONE (OUTPATIENT)
Dept: FAMILY MEDICINE | Facility: CLINIC | Age: 83
End: 2023-09-28
Payer: MEDICARE

## 2023-09-28 VITALS
RESPIRATION RATE: 16 BRPM | HEART RATE: 72 BPM | OXYGEN SATURATION: 96 % | DIASTOLIC BLOOD PRESSURE: 75 MMHG | SYSTOLIC BLOOD PRESSURE: 137 MMHG | HEIGHT: 62 IN | WEIGHT: 189.63 LBS | BODY MASS INDEX: 34.89 KG/M2

## 2023-09-28 NOTE — TELEPHONE ENCOUNTER
----- Message from Radha Tran sent at 9/28/2023 10:04 AM CDT -----  Regarding: Needs Medical Advice  Contact: patient at [phone]  Type: Needs Medical Advice  Who Called:  patient at [phone]    Additional Information: calling to discuss results from xray. Please call and advise. Thank you

## 2023-11-20 LAB — BCS RECOMMENDATION EXT: NORMAL

## 2023-12-07 ENCOUNTER — TELEPHONE (OUTPATIENT)
Dept: FAMILY MEDICINE | Facility: CLINIC | Age: 83
End: 2023-12-07
Payer: MEDICARE

## 2023-12-07 DIAGNOSIS — R92.8 ABNORMAL MAMMOGRAM OF LEFT BREAST: Primary | ICD-10-CM

## 2023-12-07 NOTE — TELEPHONE ENCOUNTER
Let her know I received her mammogram reports from November 20th,  she had a density in the left breast.        It is recommended to get a breast ultrasound on the left.  Placed order in her chart.  Faxed to [facility].      Also let her know her DEXA scan was normal

## 2023-12-12 ENCOUNTER — PATIENT OUTREACH (OUTPATIENT)
Dept: ADMINISTRATIVE | Facility: HOSPITAL | Age: 83
End: 2023-12-12
Payer: MEDICARE

## 2023-12-12 DIAGNOSIS — E11.9 DIABETES MELLITUS WITHOUT COMPLICATION: Primary | ICD-10-CM

## 2023-12-12 NOTE — PROGRESS NOTES
Population Health Chart Review & Patient Outreach Details    Outreach Performed: NO    Additional Pop Health Notes:           Updates Requested / Reviewed:      Updated Care Coordination Note         Health Maintenance Topics Overdue:    Health Maintenance Due   Topic Date Due    RSV Vaccine (Age 60+ and Pregnant patients) (1 - 1-dose 60+ series) Never done    DEXA Scan  10/17/2021    Eye Exam  05/26/2023    Influenza Vaccine (1) 09/01/2023    COVID-19 Vaccine (6 - 2023-24 season) 09/01/2023    Diabetes Urine Screening  12/02/2023    Hemoglobin A1c  12/22/2023         Health Maintenance Topic(s) Outreach Outcomes & Actions Taken:    Osteoporosis Screening - Outreach Outcomes & Actions Taken  : External Records Uploaded, Care Team Updated, & History Updated if Applicable    Lab(s) - Outreach Outcomes & Actions Taken  : Overdue Lab(s) Ordered and Overdue Lab(s) Scheduled

## 2023-12-22 NOTE — TELEPHONE ENCOUNTER
----- Message from Phuong Griffin sent at 9/17/2019  8:05 AM CDT -----  Type:  Test Results    Who Called:  Jacqui   Name of Test (Lab/Mammo/Etc):  CT  Date of Test:  9/10  Ordering Provider:  Dr Linares  Where the test was performed:  [facility]  Best Call Back Number:  [phone] after 1:30  Additional Information:  It is OK to leave a voice mail.  She just wants to know if she has more stones or not.  Thank you!    denies pain/discomfort (Rating = 0)

## 2023-12-27 ENCOUNTER — PATIENT OUTREACH (OUTPATIENT)
Dept: ADMINISTRATIVE | Facility: HOSPITAL | Age: 83
End: 2023-12-27
Payer: MEDICARE

## 2023-12-27 NOTE — PROGRESS NOTES
[id number]  Urgent Care Encounter       CHIEF COMPLAINT       Chief Complaint   Patient presents with    Facial Pain    Ear Fullness       Nurses Notes reviewed and I agree except as noted in the HPI. HISTORY OF PRESENT ILLNESS   Jason Esparza is a 48 y.o. male who presents For complaints of right ear pain and sinus congestion. The patient states his ongoing for the last 5 days. He has been using over-the-counter swimmer's ear drops without relief of symptoms. He complains of right ear pain and fullness. He denies nausea, vomiting, diarrhea, shortness breath, chest pain. He states that he has felt feverish and chilled. HPI    REVIEW OF SYSTEMS     Review of Systems   Constitutional: Positive for chills and fever (Subjective). Negative for activity change, appetite change and unexpected weight change. HENT: Positive for congestion, ear pain, rhinorrhea and sinus pressure. Negative for facial swelling, sinus pain, sore throat and trouble swallowing. Eyes: Negative for discharge, redness and itching. Respiratory: Negative for cough, chest tightness, shortness of breath and wheezing. Cardiovascular: Negative for chest pain. Gastrointestinal: Negative for diarrhea, nausea and vomiting. Musculoskeletal: Negative for arthralgias and myalgias. Skin: Negative for color change, pallor and rash. Neurological: Negative for dizziness and headaches. PAST MEDICAL HISTORY         Diagnosis Date    Atrial fibrillation (HCC)     GERD (gastroesophageal reflux disease)     Hyperlipidemia     Hypertension        SURGICAL HISTORY     Patient  has a past surgical history that includes Appendectomy (12/7/15).     CURRENT MEDICATIONS       Discharge Medication List as of 8/13/2018  9:09 AM      CONTINUE these medications which have NOT CHANGED    Details   finasteride (PROSCAR) 5 MG tablet Take 5 mg by mouth dailyHistorical Med      ranitidine (ZANTAC) 150 MG tablet Take 150 mg by Population Health Chart Review & Patient Outreach Details    Outreach Performed: NO    Additional Pop Health Notes:           Updates Requested / Reviewed:      Updated Care Coordination Note, , and Immunizations Reconciliation Completed or Queried: [place]         Health Maintenance Topics Overdue:    Health Maintenance Due   Topic Date Due    RSV Vaccine (Age 60+ and Pregnant patients) (1 - 1-dose 60+ series) Never done    Eye Exam  05/26/2023    COVID-19 Vaccine (7 - 2023-24 season) 12/08/2023    Diabetes Urine Screening  12/02/2023    Hemoglobin A1c  12/22/2023         Health Maintenance Topic(s) Outreach Outcomes & Actions Taken:    Breast Cancer Screening - Outreach Outcomes & Actions Taken  : External Records Uploaded & Care Team Updated if Applicable       mouth 2 times dailyHistorical Med      aspirin 325 MG tablet Take 325 mg by mouth daily. metoprolol (TOPROL-XL) 25 MG XL tablet Take 25 mg by mouth daily. lisinopril-hydrochlorothiazide (PRINZIDE;ZESTORETIC) 20-12.5 MG per tablet Take 1 tablet by mouth daily. amLODIPine (NORVASC) 5 MG tablet Take 5 mg by mouth daily. ALLERGIES     Patient is has No Known Allergies. Patients There is no immunization history for the selected administration types on file for this patient. FAMILY HISTORY     Patient's family history includes Arthritis in his mother; Asthma in his father; Cancer in his sister; Diabetes in his mother; Heart Disease in his brother, father, and mother; High Blood Pressure in his brother, father, and mother; High Cholesterol in his father and mother; Stroke in his mother. SOCIAL HISTORY     Patient  reports that he has never smoked. His smokeless tobacco use includes Chew. He reports that he does not drink alcohol or use drugs. PHYSICAL EXAM     ED TRIAGE VITALS  BP: 127/77, Temp: 99.5 °F (37.5 °C), Pulse: 71, Resp: 16, SpO2: 97 %,Estimated body mass index is 28.7 kg/m² as calculated from the following:    Height as of this encounter: 5' 10\" (1.778 m). Weight as of this encounter: 200 lb (90.7 kg). ,No LMP for male patient. Physical Exam   Constitutional: He is oriented to person, place, and time. Vital signs are normal. He appears well-developed and well-nourished. He is active and cooperative. Non-toxic appearance. He does not have a sickly appearance. He does not appear ill. No distress. HENT:   Head: Normocephalic and atraumatic. Right Ear: Hearing and external ear normal. A foreign body (Ear canal erythematous, swelling, tender to the touch.) is present. Tympanic membrane is erythematous. A middle ear effusion is present. Left Ear: Hearing, external ear and ear canal normal. A middle ear effusion is present. Nose: Rhinorrhea present.  Right sinus prescribed. Patient verbalized understanding of this plan of care.       PATIENT REFERRED TO:  Josue Askew MD  [address] [facility] [address] / [place] [address]      DISCHARGE MEDICATIONS:  Discharge Medication List as of 8/13/2018  9:09 AM      START taking these medications    Details   fluticasone (FLONASE) 50 MCG/ACT nasal spray 1 spray by Nasal route daily for 7 days, Disp-1 Bottle, R-0Normal      amoxicillin-clavulanate (AUGMENTIN) 875-125 MG per tablet Take 1 tablet by mouth 2 times daily for 7 days, Disp-14 tablet, R-0Normal      ofloxacin (FLOXIN) 0.3 % otic solution Place 10 drops into the right ear daily for 10 days, Disp-10 mL, R-0Normal             Discharge Medication List as of 8/13/2018  9:09 AM          Discharge Medication List as of 8/13/2018  9:09 AM          KEILA Cantrell CNP    (Please note that portions of this note were completed with a voice recognition program.  Efforts were made to edit the dictations but occasionally words are mis-transcribed.)            KIELA Cantrell CNP  08/13/18 1038

## 2023-12-28 ENCOUNTER — LAB VISIT (OUTPATIENT)
Dept: LAB | Facility: HOSPITAL | Age: 83
End: 2023-12-28
Payer: MEDICARE

## 2023-12-28 DIAGNOSIS — E78.2 MIXED HYPERLIPIDEMIA: ICD-10-CM

## 2023-12-28 DIAGNOSIS — R79.89 ABNORMAL CBC: ICD-10-CM

## 2023-12-28 DIAGNOSIS — E11.9 TYPE 2 DIABETES MELLITUS WITHOUT COMPLICATION, WITHOUT LONG-TERM CURRENT USE OF INSULIN: ICD-10-CM

## 2023-12-28 DIAGNOSIS — Z00.00 MEDICARE ANNUAL WELLNESS VISIT, SUBSEQUENT: ICD-10-CM

## 2023-12-28 LAB
ALBUMIN SERPL BCP-MCNC: 4.1 G/DL (ref 3.5–5.2)
ALP SERPL-CCNC: 62 U/L (ref 55–135)
ALT SERPL W/O P-5'-P-CCNC: 76 U/L (ref 10–44)
ANION GAP SERPL CALC-SCNC: 8 MMOL/L (ref 8–16)
AST SERPL-CCNC: 50 U/L (ref 10–40)
BILIRUB SERPL-MCNC: 0.6 MG/DL (ref 0.1–1)
BUN SERPL-MCNC: 19 MG/DL (ref 8–23)
CALCIUM SERPL-MCNC: 9.3 MG/DL (ref 8.7–10.5)
CHLORIDE SERPL-SCNC: 110 MMOL/L (ref 95–110)
CHOLEST SERPL-MCNC: 176 MG/DL (ref 120–199)
CHOLEST/HDLC SERPL: 3 {RATIO} (ref 2–5)
CO2 SERPL-SCNC: 23 MMOL/L (ref 23–29)
CREAT SERPL-MCNC: 1.1 MG/DL (ref 0.5–1.4)
ERYTHROCYTE [DISTWIDTH] IN BLOOD BY AUTOMATED COUNT: 13.3 % (ref 11.5–14.5)
EST. GFR  (NO RACE VARIABLE): 50.2 ML/MIN/1.73 M^2
ESTIMATED AVG GLUCOSE: 160 MG/DL (ref 68–131)
GLUCOSE SERPL-MCNC: 158 MG/DL (ref 70–110)
HBA1C MFR BLD: 7.2 % (ref 4–5.6)
HCT VFR BLD AUTO: 39.9 % (ref 37–48.5)
HDLC SERPL-MCNC: 59 MG/DL (ref 40–75)
HDLC SERPL: 33.5 % (ref 20–50)
HGB BLD-MCNC: 13 G/DL (ref 12–16)
LDLC SERPL CALC-MCNC: 94.4 MG/DL (ref 63–159)
MCH RBC QN AUTO: 29.5 PG (ref 27–31)
MCHC RBC AUTO-ENTMCNC: 32.6 G/DL (ref 32–36)
MCV RBC AUTO: 91 FL (ref 82–98)
NONHDLC SERPL-MCNC: 117 MG/DL
PLATELET # BLD AUTO: 233 K/UL (ref 150–450)
PMV BLD AUTO: 10.8 FL (ref 9.2–12.9)
POTASSIUM SERPL-SCNC: 4.4 MMOL/L (ref 3.5–5.1)
PROT SERPL-MCNC: 7.2 G/DL (ref 6–8.4)
RBC # BLD AUTO: 4.41 M/UL (ref 4–5.4)
SODIUM SERPL-SCNC: 141 MMOL/L (ref 136–145)
TRIGL SERPL-MCNC: 113 MG/DL (ref 30–150)
WBC # BLD AUTO: 7.18 K/UL (ref 3.9–12.7)

## 2023-12-28 PROCEDURE — 80061 LIPID PANEL: CPT | Mod: HCNC | Performed by: INTERNAL MEDICINE

## 2023-12-28 PROCEDURE — 36415 COLL VENOUS BLD VENIPUNCTURE: CPT | Mod: HCNC,PO | Performed by: INTERNAL MEDICINE

## 2023-12-28 PROCEDURE — 85027 COMPLETE CBC AUTOMATED: CPT | Mod: HCNC | Performed by: INTERNAL MEDICINE

## 2023-12-28 PROCEDURE — 83036 HEMOGLOBIN GLYCOSYLATED A1C: CPT | Mod: HCNC | Performed by: INTERNAL MEDICINE

## 2023-12-28 PROCEDURE — 80053 COMPREHEN METABOLIC PANEL: CPT | Mod: HCNC | Performed by: INTERNAL MEDICINE

## 2024-01-04 ENCOUNTER — OFFICE VISIT (OUTPATIENT)
Dept: FAMILY MEDICINE | Facility: CLINIC | Age: 84
End: 2024-01-04
Payer: MEDICARE

## 2024-01-04 VITALS
WEIGHT: 186.38 LBS | SYSTOLIC BLOOD PRESSURE: 138 MMHG | BODY MASS INDEX: 34.3 KG/M2 | HEIGHT: 62 IN | DIASTOLIC BLOOD PRESSURE: 68 MMHG

## 2024-01-04 DIAGNOSIS — N60.09 COMPLEX CYST OF BREAST: ICD-10-CM

## 2024-01-04 DIAGNOSIS — I70.0 AORTIC ATHEROSCLEROSIS: ICD-10-CM

## 2024-01-04 DIAGNOSIS — E78.2 DM TYPE 2 WITH DIABETIC MIXED HYPERLIPIDEMIA: ICD-10-CM

## 2024-01-04 DIAGNOSIS — N20.0 KIDNEY STONES: ICD-10-CM

## 2024-01-04 DIAGNOSIS — E66.01 SEVERE OBESITY (BMI 35.0-39.9) WITH COMORBIDITY: ICD-10-CM

## 2024-01-04 DIAGNOSIS — E11.69 DM TYPE 2 WITH DIABETIC MIXED HYPERLIPIDEMIA: ICD-10-CM

## 2024-01-04 DIAGNOSIS — N64.89 OTHER SPECIFIED DISORDERS OF BREAST: ICD-10-CM

## 2024-01-04 DIAGNOSIS — N18.31 CHRONIC KIDNEY DISEASE, STAGE 3A: ICD-10-CM

## 2024-01-04 DIAGNOSIS — E11.9 TYPE 2 DIABETES MELLITUS WITHOUT COMPLICATION, WITHOUT LONG-TERM CURRENT USE OF INSULIN: ICD-10-CM

## 2024-01-04 DIAGNOSIS — Z00.00 MEDICARE ANNUAL WELLNESS VISIT, SUBSEQUENT: ICD-10-CM

## 2024-01-04 DIAGNOSIS — I48.0 PAROXYSMAL ATRIAL FIBRILLATION: Primary | ICD-10-CM

## 2024-01-04 DIAGNOSIS — E78.2 MIXED HYPERLIPIDEMIA: ICD-10-CM

## 2024-01-04 PROCEDURE — 3288F FALL RISK ASSESSMENT DOCD: CPT | Mod: HCNC,CPTII,S$GLB, | Performed by: INTERNAL MEDICINE

## 2024-01-04 PROCEDURE — 3078F DIAST BP <80 MM HG: CPT | Mod: HCNC,CPTII,S$GLB, | Performed by: INTERNAL MEDICINE

## 2024-01-04 PROCEDURE — 99999 PR PBB SHADOW E&M-EST. PATIENT-LVL V: CPT | Mod: PBBFAC,HCNC,, | Performed by: INTERNAL MEDICINE

## 2024-01-04 PROCEDURE — 1101F PT FALLS ASSESS-DOCD LE1/YR: CPT | Mod: HCNC,CPTII,S$GLB, | Performed by: INTERNAL MEDICINE

## 2024-01-04 PROCEDURE — 1126F AMNT PAIN NOTED NONE PRSNT: CPT | Mod: HCNC,CPTII,S$GLB, | Performed by: INTERNAL MEDICINE

## 2024-01-04 PROCEDURE — 1160F RVW MEDS BY RX/DR IN RCRD: CPT | Mod: HCNC,CPTII,S$GLB, | Performed by: INTERNAL MEDICINE

## 2024-01-04 PROCEDURE — 1159F MED LIST DOCD IN RCRD: CPT | Mod: HCNC,CPTII,S$GLB, | Performed by: INTERNAL MEDICINE

## 2024-01-04 PROCEDURE — 3075F SYST BP GE 130 - 139MM HG: CPT | Mod: HCNC,CPTII,S$GLB, | Performed by: INTERNAL MEDICINE

## 2024-01-04 PROCEDURE — 99214 OFFICE O/P EST MOD 30 MIN: CPT | Mod: HCNC,S$GLB,, | Performed by: INTERNAL MEDICINE

## 2024-01-04 RX ORDER — APIXABAN 5 MG/1
5 TABLET, FILM COATED ORAL 2 TIMES DAILY
COMMUNITY

## 2024-01-04 RX ORDER — SOTALOL HYDROCHLORIDE 80 MG/1
80 TABLET ORAL 2 TIMES DAILY
COMMUNITY

## 2024-01-04 NOTE — PROGRESS NOTES
Subjective:       Patient ID: Jacqui Luther is a 83 y.o. female.    Chief Complaint: Follow-up   Follow-up  Pertinent negatives include no chest pain or joint swelling.       Gyn: no defer age   MMG:  LV 12/2023   Dexa: yes LV 2023  normal   Colonoscopy:  Dr Callahan 8/2020 polyp/ r/c as needed   Immunizations: Flu: Y Tdap: 2022 Pneumovax: 2021  Prevnar 13: 2020  Shingles: old Covid: yes   Smoker: no   Eye: Dr Zamorano 5/2022 + dry eye.   Scanned N        138/68 Hr 61 today at home  Wants to see urology - hx of kidney stones. Has had IVP in past      P A Fib:  while sleeping - 48 min episode.  Rx Sotolo 80 bid, Eliquis 5 bid - off ASA     White coat hypertension     TIA:  hx admit LV. Witnessed. Seen Neuro Serge. S/p Emg BLE and mild numb lat feet.                Loop recorder: 4/2023.    Atherosclerosis arteries: Dr Hurley     Type 2 Diabetes:  moning 169-198 //  A1c 7.2 prev 7.5 G // Rx: Metformin ER 1500 twice daily       Mixed HLD:  LDL goal <70// improved L 81. //  Rx Crestor 5    CKD stage 2/3: stay hydrated Cr 0.9-1.1     Elevated cyclic LFT: stable AST/AL 60/88      Review CT 2019 shows benign left adrenal adenoma, B kidney cyst.     ____________________________________________________________________________________________________  Assessment & Plan:  1. Paroxysmal atrial fibrillation    2. Kidney stones  - Ambulatory referral/consult to Urology; Future    3. Mixed hyperlipidemia    4. DM type 2 with diabetic mixed hyperlipidemia  - Comprehensive Metabolic Panel; Future  - Hemoglobin A1C; Future  - Lipid Panel; Future    5. Medicare annual wellness visit, subsequent  - Comprehensive Metabolic Panel; Future  - Hemoglobin A1C; Future  - Lipid Panel; Future    6. Complex cyst of breast  - US Breast Left Complete; Future  - Mammo Digital Diagnostic Left with Eddy; Future  - US Breast Left Complete  - Mammo Digital Diagnostic Left with Eddy    7. Other specified disorders of breast  - Mammo Digital  Diagnostic Left with Eddy; Future  - Mammo Digital Diagnostic Left with Eddy    8. Chronic kidney disease, stage 3a    9. Type 2 diabetes mellitus without complication, without long-term current use of insulin    10. Severe obesity (BMI 35.0-39.9) with comorbidity    11. Aortic atherosclerosis     Paroxysmal atrial fibrillation    Kidney stones  -     Ambulatory referral/consult to Urology; Future; Expected date: 01/11/2024    Mixed hyperlipidemia    DM type 2 with diabetic mixed hyperlipidemia  -     Comprehensive Metabolic Panel; Future; Expected date: 01/04/2024  -     Hemoglobin A1C; Future; Expected date: 01/04/2024  -     Lipid Panel; Future; Expected date: 01/04/2024    Medicare annual wellness visit, subsequent  -     Comprehensive Metabolic Panel; Future; Expected date: 01/04/2024  -     Hemoglobin A1C; Future; Expected date: 01/04/2024  -     Lipid Panel; Future; Expected date: 01/04/2024    Complex cyst of breast  -     US Breast Left Complete; Future; Expected date: 06/05/2024  -     Mammo Digital Diagnostic Left with Eddy; Future; Expected date: 06/05/2024    Other specified disorders of breast  -     Mammo Digital Diagnostic Left with Eddy; Future; Expected date: 06/05/2024    Chronic kidney disease, stage 3a    Type 2 diabetes mellitus without complication, without long-term current use of insulin    Severe obesity (BMI 35.0-39.9) with comorbidity    Aortic atherosclerosis        Continue to work on regular exercise, maintain healthy weight, balanced diet. Avoid unhealthy habits: smoking, excessive alcohol intake.     Disclaimer: This note was partly generated using dictation software which may occasionally result in transcription errors  ____________________________________________________________________________________________________  Review of Systems:  Review of Systems   Constitutional:  Negative for appetite change.   HENT:  Negative for nosebleeds.    Eyes:  Negative for pain.   Respiratory:   Negative for choking.    Cardiovascular:  Negative for chest pain.   Gastrointestinal:  Negative for blood in stool.   Genitourinary:  Negative for hematuria.   Musculoskeletal:  Negative for joint swelling.   Skin:  Negative for pallor.   Neurological:  Negative for facial asymmetry.   Hematological:  Does not bruise/bleed easily.   Psychiatric/Behavioral:  Negative for confusion.        Objective:     Wt Readings from Last 3 Encounters:   01/04/24 84.5 kg (186 lb 6.4 oz)   09/26/23 86 kg (189 lb 9.5 oz)   06/29/23 85.1 kg (187 lb 9.8 oz)     BP Readings from Last 3 Encounters:   01/04/24 138/68   09/28/23 137/75   06/29/23 132/68       Lab Results   Component Value Date    WBC 7.18 12/28/2023    HGB 13.0 12/28/2023    HCT 39.9 12/28/2023     12/28/2023     12/28/2023    K 4.4 12/28/2023     12/28/2023    ALT 76 (H) 12/28/2023    AST 50 (H) 12/28/2023    CO2 23 12/28/2023    CREATININE 1.1 12/28/2023    BUN 19 12/28/2023     (H) 12/28/2023      Hemoglobin A1C   Date Value Ref Range Status   12/28/2023 7.2 (H) 4.0 - 5.6 % Final     Comment:     ADA Screening Guidelines:  5.7-6.4%  Consistent with prediabetes  >or=6.5%  Consistent with diabetes    High levels of fetal hemoglobin interfere with the HbA1C  assay. Heterozygous hemoglobin variants (HbS, HgC, etc)do  not significantly interfere with this assay.   However, presence of multiple variants may affect accuracy.     06/22/2023 7.2 (H) 4.0 - 5.6 % Final     Comment:     ADA Screening Guidelines:  5.7-6.4%  Consistent with prediabetes  >or=6.5%  Consistent with diabetes    High levels of fetal hemoglobin interfere with the HbA1C  assay. Heterozygous hemoglobin variants (HbS, HgC, etc)do  not significantly interfere with this assay.   However, presence of multiple variants may affect accuracy.     12/02/2022 7.2 (H) 4.0 - 5.6 % Final     Comment:     ADA Screening Guidelines:  5.7-6.4%  Consistent with prediabetes  >or=6.5%  Consistent  "with diabetes    High levels of fetal hemoglobin interfere with the HbA1C  assay. Heterozygous hemoglobin variants (HbS, HgC, etc)do  not significantly interfere with this assay.   However, presence of multiple variants may affect accuracy.        Lab Results   Component Value Date    TSH 1.66 05/25/2022     No results found for: "FREET4"  Lab Results   Component Value Date    LDLCALC 94.4 12/28/2023    LDLCALC 81.2 06/22/2023    LDLCALC 101.6 12/02/2022     Lab Results   Component Value Date    TRIG 113 12/28/2023    TRIG 129 06/22/2023    TRIG 112 12/02/2022            Physical Exam  Constitutional:       Appearance: Normal appearance.   HENT:      Head: Normocephalic and atraumatic.   Eyes:      Extraocular Movements: Extraocular movements intact.      Pupils: Pupils are equal, round, and reactive to light.   Cardiovascular:      Rate and Rhythm: Normal rate and regular rhythm.   Pulmonary:      Effort: Pulmonary effort is normal.      Breath sounds: Normal breath sounds.   Neurological:      Mental Status: She is alert.         Medication List with Changes/Refills   Current Medications    AMOXICILLIN (AMOXIL) 500 MG CAPSULE    amoxicillin 500 mg capsule   TAKE FOUR CAPSULES BY MOUTH 30 60 MINUTES BEFORE DENTAL WORK    ASPIRIN 81 MG CAP    Take 81 mg by mouth once.    CARBOXYMETHYLCELLULOSE (REFRESH PLUS) 0.5 % DPET    1 drop 3 (three) times daily as needed.    CLOPIDOGREL (PLAVIX) 75 MG TABLET        COENZYME Q10 200 MG CAPSULE    Take 200 mg by mouth once daily.    ELIQUIS 5 MG TAB    Take 5 mg by mouth 2 (two) times daily.    FISH,BORA,FLAX OILS-OM3,6,9 #1 1,200 MG CAP    Take by mouth. 3 capsules daily    GLUCOSAMINE/CHONDR FULLER A SOD (OSTEO BI-FLEX ORAL)    Take 2 capsules by mouth once daily.    METFORMIN (GLUCOPHAGE-XR) 500 MG ER 24HR TABLET    Take 3 tablets (1,500 mg total) by mouth once daily.    MULTIVITAMIN WITH MINERALS TABLET    Take 1 tablet by mouth once daily.    ROSUVASTATIN (CRESTOR) 5 MG TABLET  "   Take 1 tablet (5 mg total) by mouth once daily.    SOTALOL (BETAPACE) 80 MG TABLET    Take 80 mg by mouth 2 (two) times daily.    TURMERIC (CURCUMIN MISC)    1 capsule by Misc.(Non-Drug; Combo Route) route.

## 2024-03-13 ENCOUNTER — TELEPHONE (OUTPATIENT)
Dept: UROLOGY | Facility: CLINIC | Age: 84
End: 2024-03-13

## 2024-03-13 ENCOUNTER — HOSPITAL ENCOUNTER (OUTPATIENT)
Dept: RADIOLOGY | Facility: HOSPITAL | Age: 84
Discharge: HOME | End: 2024-03-13
Payer: MEDICARE

## 2024-03-13 ENCOUNTER — OFFICE VISIT (OUTPATIENT)
Dept: UROLOGY | Facility: CLINIC | Age: 84
End: 2024-03-13
Payer: MEDICARE

## 2024-03-13 VITALS — BODY MASS INDEX: 34.89 KG/M2 | HEIGHT: 62 IN | WEIGHT: 189.63 LBS

## 2024-03-13 DIAGNOSIS — N20.0 RIGHT RENAL STONE: ICD-10-CM

## 2024-03-13 DIAGNOSIS — Z87.442 HISTORY OF KIDNEY STONES: ICD-10-CM

## 2024-03-13 DIAGNOSIS — N20.0 KIDNEY STONES: ICD-10-CM

## 2024-03-13 DIAGNOSIS — N20.0 KIDNEY STONES: Primary | ICD-10-CM

## 2024-03-13 PROCEDURE — 74176 CT ABD & PELVIS W/O CONTRAST: CPT | Mod: TC,HCNC,PO

## 2024-03-13 PROCEDURE — 3288F FALL RISK ASSESSMENT DOCD: CPT | Mod: HCNC,CPTII,S$GLB,

## 2024-03-13 PROCEDURE — 1160F RVW MEDS BY RX/DR IN RCRD: CPT | Mod: HCNC,CPTII,S$GLB,

## 2024-03-13 PROCEDURE — 1101F PT FALLS ASSESS-DOCD LE1/YR: CPT | Mod: HCNC,CPTII,S$GLB,

## 2024-03-13 PROCEDURE — 1159F MED LIST DOCD IN RCRD: CPT | Mod: HCNC,CPTII,S$GLB,

## 2024-03-13 PROCEDURE — 99214 OFFICE O/P EST MOD 30 MIN: CPT | Mod: HCNC,S$GLB,,

## 2024-03-13 PROCEDURE — 74176 CT ABD & PELVIS W/O CONTRAST: CPT | Mod: 26,HCNC,, | Performed by: RADIOLOGY

## 2024-03-13 PROCEDURE — 99999 PR PBB SHADOW E&M-EST. PATIENT-LVL III: CPT | Mod: PBBFAC,HCNC,,

## 2024-03-13 NOTE — PROGRESS NOTES
[facility] Note  Staff: Mel Araiza FNP-C    PCP: MD Shanta    Chief Complaint: Kidney Stones    Subjective:        HPI: Jacqui Luther is a 83 y.o. female NEW PATIENT presents today for evaluation of kidney stone. She was established to our office and followed by Dr. Linares. She has a long history of kidney stones. She states the last time imaging was done was in 2019 which showed a 3mm right renal stone. She states she experienced an episode of gross hematuria about 1 month ago which prompted this appt. She denies any symptoms today. She would like to update imaging. She denies dysuria, hematuria, fever, flank pain, abd pain, incontinence, and difficulty urinating.     Questions asked pt during office visit today:  DysuriaNo  Gross HematuriaNo    History of Kidney Stones?:  Yes    Constipation issues?:  No    REVIEW OF SYSTEMS:  Review of Systems   Constitutional: Negative.  Negative for chills and fever.   HENT: Negative.     Eyes: Negative.    Respiratory: Negative.     Cardiovascular: Negative.    Gastrointestinal: Negative.  Negative for abdominal pain, constipation, diarrhea, nausea and vomiting.   Genitourinary: Negative.  Negative for dysuria, flank pain, frequency, hematuria and urgency.   Musculoskeletal: Negative.  Negative for back pain.   Skin: Negative.    Neurological: Negative.    Endo/Heme/Allergies: Negative.    Psychiatric/Behavioral: Negative.         PMHx:  Past Medical History:   Diagnosis Date    Coronary artery disease     COVID-19 virus infection     May 2022    Dry eyes     Elevated cholesterol     Essential hypertension     Kidney stones     Nephrolithiasis     Obesity     Osteoarthritis     Paroxysmal atrial fibrillation     Tendonitis     TIA (transient ischemic attack) 2022    [facility]    Type 2 diabetes mellitus, without long-term current use of insulin        PSHx:  Past Surgical History:   Procedure Laterality Date    CATARACT  EXTRACTION Bilateral     Dr Zamorano      SECTION  1973    x 1    CHOLECYSTECTOMY  2008    [place]    INSERTION OF IMPLANTABLE LOOP RECORDER  2023    Dr Ohara    removal of breast tissue (axillary)  2009    benign    removal of colon polyps       - r/c 3 yrs Dr Callahan     TOTAL KNEE ARTHROPLASTY Right 02/15/2021       Fam Hx:   malignancies: No , gyn malignancies: No   kidney stones: Yes - father brother     Soc Hx:  Lives in [place]    Allergies:  Poison ivy extract and Poison oak extract    Medications: reviewed     Objective:   There were no vitals filed for this visit.    Physical Exam  Constitutional:       Appearance: Normal appearance.   HENT:      Head: Normocephalic.      Mouth/Throat:      Mouth: Mucous membranes are moist.   Eyes:      Conjunctiva/sclera: Conjunctivae normal.   Pulmonary:      Effort: Pulmonary effort is normal.   Abdominal:      General: There is no distension.      Palpations: Abdomen is soft.      Tenderness: There is no abdominal tenderness. There is no right CVA tenderness or left CVA tenderness.   Musculoskeletal:         General: Normal range of motion.      Cervical back: Normal range of motion.   Skin:     General: Skin is warm.   Neurological:      Mental Status: She is alert and oriented to person, place, and time.   Psychiatric:         Mood and Affect: Mood normal.         Behavior: Behavior normal.         Assessment:       1. Kidney stones    2. Right renal stone    3. History of kidney stones          Plan:      CT RSS ordered and scheduled  Things you can do to decrease your risk of recurring stones:  1. Increase fluid intake - You should be producing at least 2.5 L of urine per 24 hour period. If your urine is dark you need to be drinking more water.  2. Lower sodium intake - this helps lower the amount of calcium being lost in your urine. An ideal intake is between 2300 and 3300mg of sodium daily.  3. Normal calcium diet - ideal intake is between  800 and 1200mg of calcium daily. You do not want to decrease the amount of calcium you take in because this can actually increase your risk of making stone.     Limit iced tea and lima,  avoid Tums and Rolaids, to avoid Vitamin C supplementation and to limit salt and red meat intake.      F/u as needed    [facility]: Pending    SAKSHI CarbajalP-C

## 2024-03-13 NOTE — TELEPHONE ENCOUNTER
----- Message from Mel Araiza NP sent at 3/13/2024  2:07 PM CDT -----  Please call pt and her let know stone unchanged- 2mm in right kidney

## 2024-05-07 DIAGNOSIS — E78.2 MIXED HYPERLIPIDEMIA: ICD-10-CM

## 2024-05-07 RX ORDER — ROSUVASTATIN CALCIUM 5 MG/1
5 TABLET, COATED ORAL
Qty: 90 TABLET | Refills: 2 | Status: SHIPPED | OUTPATIENT
Start: 2024-05-07

## 2024-05-07 NOTE — TELEPHONE ENCOUNTER
Care Due:                  Date            Visit Type   Department     Provider  --------------------------------------------------------------------------------                                EP -                              [facility] FAMILY  Last Visit: 01-      CARE ([facility])   RUDY Womack                               -                              [facility]  Next Visit: 06-      CARE ([facility])   MEDICINE       Willis Womack                                                            Last  Test          Frequency    Reason                     Performed    Due Date  --------------------------------------------------------------------------------    HBA1C.......  6 months...  metFORMIN................  12- 06-    [facility] Embedded Care Due Messages. Reference number: [id number].   5/07/2024 6:51:46 AM CDT

## 2024-05-07 NOTE — TELEPHONE ENCOUNTER
Provider Staff:  Action required for this patient    Requires labs      Please see care gap opportunities below in Care Due Message.    Thanks!  [facility]     Appointments      Date Provider   Last Visit   1/4/2024 Willis Womack MD   Next Visit   6/25/2024 Willis Womack MD     Refill Decision Note   Jacqui Luther  is requesting a refill authorization.  Brief Assessment and Rationale for Refill:  Approve     Medication Therapy Plan:         Comments:     Note composed:7:30 AM 05/07/2024

## 2024-05-17 ENCOUNTER — TELEPHONE (OUTPATIENT)
Dept: ADMINISTRATIVE | Facility: CLINIC | Age: 84
End: 2024-05-17
Payer: MEDICARE

## 2024-05-20 ENCOUNTER — CLINICAL SUPPORT (OUTPATIENT)
Dept: AUDIOLOGY | Facility: CLINIC | Age: 84
End: 2024-05-20
Payer: MEDICARE

## 2024-05-20 ENCOUNTER — OFFICE VISIT (OUTPATIENT)
Dept: FAMILY MEDICINE | Facility: CLINIC | Age: 84
End: 2024-05-20
Payer: MEDICARE

## 2024-05-20 VITALS
HEIGHT: 62 IN | DIASTOLIC BLOOD PRESSURE: 76 MMHG | OXYGEN SATURATION: 96 % | WEIGHT: 187.38 LBS | SYSTOLIC BLOOD PRESSURE: 130 MMHG | HEART RATE: 62 BPM | BODY MASS INDEX: 34.48 KG/M2

## 2024-05-20 DIAGNOSIS — I48.0 PAROXYSMAL ATRIAL FIBRILLATION: ICD-10-CM

## 2024-05-20 DIAGNOSIS — E11.69 DM TYPE 2 WITH DIABETIC MIXED HYPERLIPIDEMIA: ICD-10-CM

## 2024-05-20 DIAGNOSIS — E78.2 MIXED HYPERLIPIDEMIA: ICD-10-CM

## 2024-05-20 DIAGNOSIS — Z00.00 ENCOUNTER FOR PREVENTIVE HEALTH EXAMINATION: Primary | ICD-10-CM

## 2024-05-20 DIAGNOSIS — H91.90 DECREASED HEARING, UNSPECIFIED LATERALITY: ICD-10-CM

## 2024-05-20 DIAGNOSIS — I25.10 CORONARY ARTERIOSCLEROSIS: ICD-10-CM

## 2024-05-20 DIAGNOSIS — H90.3 BILATERAL SENSORINEURAL HEARING LOSS: Primary | ICD-10-CM

## 2024-05-20 DIAGNOSIS — Z77.122 HISTORY OF EXPOSURE TO NOISE: ICD-10-CM

## 2024-05-20 DIAGNOSIS — N18.31 CHRONIC KIDNEY DISEASE, STAGE 3A: ICD-10-CM

## 2024-05-20 DIAGNOSIS — I70.0 AORTIC ATHEROSCLEROSIS: ICD-10-CM

## 2024-05-20 DIAGNOSIS — E66.01 SEVERE OBESITY (BMI 35.0-39.9) WITH COMORBIDITY: ICD-10-CM

## 2024-05-20 DIAGNOSIS — Z00.00 ENCOUNTER FOR PREVENTIVE HEALTH EXAMINATION: ICD-10-CM

## 2024-05-20 DIAGNOSIS — E78.2 DM TYPE 2 WITH DIABETIC MIXED HYPERLIPIDEMIA: ICD-10-CM

## 2024-05-20 DIAGNOSIS — E11.9 TYPE 2 DIABETES MELLITUS WITHOUT COMPLICATION, WITHOUT LONG-TERM CURRENT USE OF INSULIN: ICD-10-CM

## 2024-05-20 DIAGNOSIS — H93.13 BILATERAL TINNITUS: ICD-10-CM

## 2024-05-20 PROCEDURE — 1159F MED LIST DOCD IN RCRD: CPT | Mod: HCNC,CPTII,S$GLB, | Performed by: NURSE PRACTITIONER

## 2024-05-20 PROCEDURE — 3078F DIAST BP <80 MM HG: CPT | Mod: HCNC,CPTII,S$GLB, | Performed by: NURSE PRACTITIONER

## 2024-05-20 PROCEDURE — 3288F FALL RISK ASSESSMENT DOCD: CPT | Mod: HCNC,CPTII,S$GLB, | Performed by: NURSE PRACTITIONER

## 2024-05-20 PROCEDURE — 1101F PT FALLS ASSESS-DOCD LE1/YR: CPT | Mod: HCNC,CPTII,S$GLB, | Performed by: NURSE PRACTITIONER

## 2024-05-20 PROCEDURE — 1158F ADVNC CARE PLAN TLK DOCD: CPT | Mod: HCNC,CPTII,S$GLB, | Performed by: NURSE PRACTITIONER

## 2024-05-20 PROCEDURE — 99999 PR PBB SHADOW E&M-EST. PATIENT-LVL V: CPT | Mod: PBBFAC,HCNC,, | Performed by: NURSE PRACTITIONER

## 2024-05-20 PROCEDURE — 3075F SYST BP GE 130 - 139MM HG: CPT | Mod: HCNC,CPTII,S$GLB, | Performed by: NURSE PRACTITIONER

## 2024-05-20 PROCEDURE — 1160F RVW MEDS BY RX/DR IN RCRD: CPT | Mod: HCNC,CPTII,S$GLB, | Performed by: NURSE PRACTITIONER

## 2024-05-20 PROCEDURE — 1170F FXNL STATUS ASSESSED: CPT | Mod: HCNC,CPTII,S$GLB, | Performed by: NURSE PRACTITIONER

## 2024-05-20 PROCEDURE — 92557 COMPREHENSIVE HEARING TEST: CPT | Mod: HCNC,S$GLB,, | Performed by: AUDIOLOGIST

## 2024-05-20 PROCEDURE — 1126F AMNT PAIN NOTED NONE PRSNT: CPT | Mod: HCNC,CPTII,S$GLB, | Performed by: NURSE PRACTITIONER

## 2024-05-20 PROCEDURE — 99999 PR PBB SHADOW E&M-EST. PATIENT-LVL I: CPT | Mod: PBBFAC,HCNC,, | Performed by: AUDIOLOGIST

## 2024-05-20 PROCEDURE — 92567 TYMPANOMETRY: CPT | Mod: HCNC,S$GLB,, | Performed by: AUDIOLOGIST

## 2024-05-20 PROCEDURE — G0439 PPPS, SUBSEQ VISIT: HCPCS | Mod: HCNC,S$GLB,, | Performed by: NURSE PRACTITIONER

## 2024-05-20 RX ORDER — LATANOPROST 50 UG/ML
1 SOLUTION/ DROPS OPHTHALMIC NIGHTLY
COMMUNITY
Start: 2024-04-16 | End: 2024-05-20 | Stop reason: SDUPTHER

## 2024-05-20 RX ORDER — LATANOPROST 50 UG/ML
1 SOLUTION/ DROPS OPHTHALMIC NIGHTLY
Qty: 2.5 ML | Refills: 0 | Status: SHIPPED | OUTPATIENT
Start: 2024-05-20

## 2024-05-20 NOTE — Clinical Note
"Jany,   Please see the attached audiogram and notes from Jacquikodak Luther's recent visit to Audiology.  I reviewed the recommendations with her and answered her questions.  If you do not have any concerns about her proceeding with hearing aids please document in her chart that she is "medically cleared for hearing aids" so that she can proceed with getting them when she is ready to do so. Thank you for referring her to Audiology.    Ira Renner"

## 2024-05-20 NOTE — PROGRESS NOTES
Jacqui Luther was seen 05/20/2024 for an audiological evaluation.      Pt reported loud noise exposure during her  service.  She said that she has to turn the TV and radio up louder than she used to in order to hear clearly.  She reported intermittent bilateral tinnitus.     Otoscopy revealed clear view of both external ear canals and tympanic membranes.  No obstructive cerumen present in either ear canal.       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  Speech Reception Thresholds were 20 dBHL for the right ear and 20 dBHL for the left ear.  Word recognition scores were good for the right ear and fair for the left ear.   Tympanograms were Type A for the right ear and Type As for the left ear.    Audiogram results were reviewed in detail with patient and all questions were answered. Results will be reviewed by the referring provider at the completion of this note.      Recommend binaural amplification pending medical clearance, hearing protection for all loud sounds and annual audiogram to monitor hearing loss.

## 2024-05-20 NOTE — PATIENT INSTRUCTIONS
Counseling and Referral of Other Preventative  (Italic type indicates deductible and co-insurance are waived)    Patient Name: Jacqui Luther  Today's Date: 5/20/2024    Health Maintenance       Date Due Completion Date    RSV Vaccine (Age 60+ and Pregnant patients) (1 - 1-dose 60+ series) Never done ---    COVID-19 Vaccine (7 - 2023-24 season) 02/13/2024 10/13/2023    Hemoglobin A1c 06/28/2024 12/28/2023    Diabetes Urine Screening 12/28/2024 12/28/2023    Lipid Panel 12/28/2024 12/28/2023    Eye Exam 02/23/2025 2/23/2024    DEXA Scan 11/20/2025 11/20/2023    TETANUS VACCINE 10/06/2032 10/6/2022        No orders of the defined types were placed in this encounter.    The following information is provided to all patients.  This information is to help you find resources for any of the problems found today that may be affecting your health:                  Living healthy guide: www.[facility].[place].gov      Understanding Diabetes: www.diabetes.org      Eating healthy: www.cdc.gov/healthyweight      CDC home safety checklist: www.cdc.gov/steadi/patient.html      Agency on Aging: www.goea.[place].gov      Alcoholics anonymous (AA): www.aa.org      Physical Activity: www.sol.nih.gov/mj4krwd      Tobacco use: www.quitwithusla.org

## 2024-05-20 NOTE — PROGRESS NOTES
"  Jacqui Luther presented for an initial Medicare AWV today. The following components were reviewed and updated:    Medical history  Family History  Social history  Allergies and Current Medications  Health Risk Assessment  Health Maintenance  Care Team    **See Completed Assessments for Annual Wellness visit with in the encounter summary    The following assessments were completed:  Depression Screening  Cognitive function Screening    Timed Get Up Test  Whisper Test    Opioid documentation:  Patient does not have a current opioid prescription.        Vitals:    05/20/24 0818   BP: 130/76   BP Location: Left arm   Patient Position: Sitting   BP Method: Medium (Manual)   Pulse: 62   SpO2: 96%   Weight: 85 kg (187 lb 6.3 oz)   Height: 5' 2" (1.575 m)     Body mass index is 34.27 kg/m².       Physical Exam  Vitals reviewed.   Constitutional:       General: She is not in acute distress.  HENT:      Right Ear: Tympanic membrane, ear canal and external ear normal. There is no impacted cerumen.      Left Ear: Tympanic membrane, ear canal and external ear normal. There is no impacted cerumen.   Pulmonary:      Effort: Pulmonary effort is normal. No respiratory distress.   Neurological:      Mental Status: She is alert and oriented to person, place, and time.   Psychiatric:         Mood and Affect: Mood normal.         Thought Content: Thought content normal.         Judgment: Judgment normal.   Diagnoses and health risks identified today and associated recommendations/orders:  1. Encounter for preventive health examination  Reviewed and discussed health maintenance.    - Ambulatory referral/consult to Audiology; Future    2. Aortic atherosclerosis  Stable- continue current treatment and follow up routinely with PCP and cardiology ()    3. Paroxysmal atrial fibrillation  Stable- continue current treatment and follow up routinely with PCP and cardiology ()    4. Coronary arteriosclerosis  Stable- " continue current treatment and follow up routinely with PCP and cardiology ()  HTN and HLD controlled    5. DM type 2 with diabetic mixed hyperlipidemia  Stable but could be better controlled- continue current treatment and follow up routinely with PCP   Discussed diet modifications   Lab Results   Component Value Date    HGBA1C 7.2 (H) 12/28/2023      6. Mixed hyperlipidemia  Stable- continue current treatment and follow up routinely with PCP and cardiology ()  Lab Results   Component Value Date    CHOL 176 12/28/2023    CHOL 168 06/22/2023    CHOL 190 12/02/2022     Lab Results   Component Value Date    HDL 59 12/28/2023    HDL 61 06/22/2023    HDL 66 12/02/2022     Lab Results   Component Value Date    LDLCALC 94.4 12/28/2023    LDLCALC 81.2 06/22/2023    LDLCALC 101.6 12/02/2022     Lab Results   Component Value Date    TRIG 113 12/28/2023    TRIG 129 06/22/2023    TRIG 112 12/02/2022       Lab Results   Component Value Date    CHOLHDL 33.5 12/28/2023    CHOLHDL 36.3 06/22/2023    CHOLHDL 34.7 12/02/2022      7. Chronic kidney disease, stage 3a  Stable- continue current treatment and follow up routinely with PCP   Avoid NSAIDs and increase fluids  CMP  Sodium   Date Value Ref Range Status   12/28/2023 141 136 - 145 mmol/L Final     Potassium   Date Value Ref Range Status   12/28/2023 4.4 3.5 - 5.1 mmol/L Final     Chloride   Date Value Ref Range Status   12/28/2023 110 95 - 110 mmol/L Final     CO2   Date Value Ref Range Status   12/28/2023 23 23 - 29 mmol/L Final     Glucose   Date Value Ref Range Status   12/28/2023 158 (H) 70 - 110 mg/dL Final     BUN   Date Value Ref Range Status   12/28/2023 19 8 - 23 mg/dL Final     Creatinine   Date Value Ref Range Status   12/28/2023 1.1 0.5 - 1.4 mg/dL Final     Calcium   Date Value Ref Range Status   12/28/2023 9.3 8.7 - 10.5 mg/dL Final     Total Protein   Date Value Ref Range Status   12/28/2023 7.2 6.0 - 8.4 g/dL Final     Albumin   Date Value  Ref Range Status   12/28/2023 4.1 3.5 - 5.2 g/dL Final     Total Bilirubin   Date Value Ref Range Status   12/28/2023 0.6 0.1 - 1.0 mg/dL Final     Comment:     For infants and newborns, interpretation of results should be based  on gestational age, weight and in agreement with clinical  observations.    Premature Infant recommended reference ranges:  Up to 24 hours.............<8.0 mg/dL  Up to 48 hours............<12.0 mg/dL  3-5 days..................<15.0 mg/dL  6-29 days.................<15.0 mg/dL       Alkaline Phosphatase   Date Value Ref Range Status   12/28/2023 62 55 - 135 U/L Final     AST   Date Value Ref Range Status   12/28/2023 50 (H) 10 - 40 U/L Final     ALT   Date Value Ref Range Status   12/28/2023 76 (H) 10 - 44 U/L Final     Anion Gap   Date Value Ref Range Status   12/28/2023 8 8 - 16 mmol/L Final     eGFR   Date Value Ref Range Status   12/28/2023 50.2 (A) >60 mL/min/1.73 m^2 Final      8. Type 2 diabetes mellitus without complication, without long-term current use of insulin  Stable- continue current treatment and follow up routinely with PCP     9. Severe obesity (BMI 35.0-39.9) with comorbidity  Encouraged healthy eating, weight loss and routine exercise     10. Decreased hearing, unspecified laterality  - Ambulatory referral/consult to Audiology; Future    Provided Jacqui with a 5-10 year written screening schedule and personal prevention plan. Recommendations were developed using the USPSTF age appropriate recommendations. Education, counseling, and referrals were provided as needed.  After Visit Summary printed and given to patient which includes a list of additional screenings\tests needed.    I offered to discuss advanced care planning, including how to pick a person who would make decisions for you if you were unable to make them for yourself, called a health care power of , and what kind of decisions you might make such as use of life sustaining treatments such as ventilators  and tube feeding when faced with a life limiting illness recorded on a living will that they will need to know. (How you want to be cared for as you near the end of your natural life)   X  Patient has advanced directives written and agrees to provide copies to the institution.  Jany Higgins, NP

## 2024-06-04 DIAGNOSIS — E11.9 TYPE 2 DIABETES MELLITUS WITHOUT COMPLICATION, WITHOUT LONG-TERM CURRENT USE OF INSULIN: ICD-10-CM

## 2024-06-04 RX ORDER — METFORMIN HYDROCHLORIDE 500 MG/1
1500 TABLET, EXTENDED RELEASE ORAL
Qty: 270 TABLET | Refills: 0 | Status: SHIPPED | OUTPATIENT
Start: 2024-06-04

## 2024-06-04 NOTE — TELEPHONE ENCOUNTER
Refill Decision Note   Jacqui Homa  is requesting a refill authorization.  Brief Assessment and Rationale for Refill:  Approve     Medication Therapy Plan:         Comments:     Note composed:9:13 AM 06/04/2024

## 2024-06-04 NOTE — TELEPHONE ENCOUNTER
No care due was identified.  [facility] Embedded Care Due Messages. Reference number: [id number].   6/04/2024 6:51:18 AM CDT

## 2024-06-18 ENCOUNTER — LAB VISIT (OUTPATIENT)
Dept: LAB | Facility: HOSPITAL | Age: 84
End: 2024-06-18
Attending: INTERNAL MEDICINE
Payer: MEDICARE

## 2024-06-18 DIAGNOSIS — E78.2 DM TYPE 2 WITH DIABETIC MIXED HYPERLIPIDEMIA: ICD-10-CM

## 2024-06-18 DIAGNOSIS — E11.69 DM TYPE 2 WITH DIABETIC MIXED HYPERLIPIDEMIA: ICD-10-CM

## 2024-06-18 DIAGNOSIS — Z00.00 MEDICARE ANNUAL WELLNESS VISIT, SUBSEQUENT: ICD-10-CM

## 2024-06-18 LAB
ALBUMIN SERPL BCP-MCNC: 3.7 G/DL (ref 3.5–5.2)
ALP SERPL-CCNC: 55 U/L (ref 55–135)
ALT SERPL W/O P-5'-P-CCNC: 68 U/L (ref 10–44)
ANION GAP SERPL CALC-SCNC: 13 MMOL/L (ref 8–16)
AST SERPL-CCNC: 48 U/L (ref 10–40)
BILIRUB SERPL-MCNC: 0.5 MG/DL (ref 0.1–1)
BUN SERPL-MCNC: 14 MG/DL (ref 8–23)
CALCIUM SERPL-MCNC: 9.7 MG/DL (ref 8.7–10.5)
CHLORIDE SERPL-SCNC: 108 MMOL/L (ref 95–110)
CHOLEST SERPL-MCNC: 176 MG/DL (ref 120–199)
CHOLEST/HDLC SERPL: 3.1 {RATIO} (ref 2–5)
CO2 SERPL-SCNC: 21 MMOL/L (ref 23–29)
CREAT SERPL-MCNC: 1 MG/DL (ref 0.5–1.4)
EST. GFR  (NO RACE VARIABLE): 56.2 ML/MIN/1.73 M^2
ESTIMATED AVG GLUCOSE: 154 MG/DL (ref 68–131)
GLUCOSE SERPL-MCNC: 171 MG/DL (ref 70–110)
HBA1C MFR BLD: 7 % (ref 4–5.6)
HDLC SERPL-MCNC: 56 MG/DL (ref 40–75)
HDLC SERPL: 31.8 % (ref 20–50)
LDLC SERPL CALC-MCNC: 87.8 MG/DL (ref 63–159)
NONHDLC SERPL-MCNC: 120 MG/DL
POTASSIUM SERPL-SCNC: 4.6 MMOL/L (ref 3.5–5.1)
PROT SERPL-MCNC: 6.8 G/DL (ref 6–8.4)
SODIUM SERPL-SCNC: 142 MMOL/L (ref 136–145)
TRIGL SERPL-MCNC: 161 MG/DL (ref 30–150)

## 2024-06-18 PROCEDURE — 80061 LIPID PANEL: CPT | Mod: HCNC | Performed by: INTERNAL MEDICINE

## 2024-06-18 PROCEDURE — 80053 COMPREHEN METABOLIC PANEL: CPT | Mod: HCNC | Performed by: INTERNAL MEDICINE

## 2024-06-18 PROCEDURE — 83036 HEMOGLOBIN GLYCOSYLATED A1C: CPT | Mod: HCNC | Performed by: INTERNAL MEDICINE

## 2024-06-18 PROCEDURE — 36415 COLL VENOUS BLD VENIPUNCTURE: CPT | Mod: HCNC,PO | Performed by: INTERNAL MEDICINE

## 2024-06-25 ENCOUNTER — OFFICE VISIT (OUTPATIENT)
Dept: FAMILY MEDICINE | Facility: CLINIC | Age: 84
End: 2024-06-25
Payer: MEDICARE

## 2024-06-25 VITALS
WEIGHT: 186.5 LBS | HEART RATE: 59 BPM | BODY MASS INDEX: 34.32 KG/M2 | SYSTOLIC BLOOD PRESSURE: 154 MMHG | DIASTOLIC BLOOD PRESSURE: 62 MMHG | RESPIRATION RATE: 18 BRPM | HEIGHT: 62 IN

## 2024-06-25 DIAGNOSIS — R74.8 ELEVATED LIVER ENZYMES: ICD-10-CM

## 2024-06-25 DIAGNOSIS — I10 PRIMARY HYPERTENSION: ICD-10-CM

## 2024-06-25 DIAGNOSIS — T46.6X5A MYALGIA DUE TO STATIN: ICD-10-CM

## 2024-06-25 DIAGNOSIS — R79.89 ABNORMAL CBC: ICD-10-CM

## 2024-06-25 DIAGNOSIS — E78.2 DM TYPE 2 WITH DIABETIC MIXED HYPERLIPIDEMIA: ICD-10-CM

## 2024-06-25 DIAGNOSIS — M79.10 MYALGIA DUE TO STATIN: ICD-10-CM

## 2024-06-25 DIAGNOSIS — E78.2 MIXED HYPERLIPIDEMIA: ICD-10-CM

## 2024-06-25 DIAGNOSIS — R21 RASH AND NONSPECIFIC SKIN ERUPTION: Primary | ICD-10-CM

## 2024-06-25 DIAGNOSIS — N18.31 CHRONIC KIDNEY DISEASE, STAGE 3A: ICD-10-CM

## 2024-06-25 DIAGNOSIS — E11.9 TYPE 2 DIABETES MELLITUS WITHOUT COMPLICATION, WITHOUT LONG-TERM CURRENT USE OF INSULIN: ICD-10-CM

## 2024-06-25 DIAGNOSIS — Z00.00 MEDICARE ANNUAL WELLNESS VISIT, SUBSEQUENT: ICD-10-CM

## 2024-06-25 DIAGNOSIS — E11.69 DM TYPE 2 WITH DIABETIC MIXED HYPERLIPIDEMIA: ICD-10-CM

## 2024-06-25 PROCEDURE — 1159F MED LIST DOCD IN RCRD: CPT | Mod: HCNC,CPTII,S$GLB, | Performed by: INTERNAL MEDICINE

## 2024-06-25 PROCEDURE — 1126F AMNT PAIN NOTED NONE PRSNT: CPT | Mod: HCNC,CPTII,S$GLB, | Performed by: INTERNAL MEDICINE

## 2024-06-25 PROCEDURE — 3078F DIAST BP <80 MM HG: CPT | Mod: HCNC,CPTII,S$GLB, | Performed by: INTERNAL MEDICINE

## 2024-06-25 PROCEDURE — 1160F RVW MEDS BY RX/DR IN RCRD: CPT | Mod: HCNC,CPTII,S$GLB, | Performed by: INTERNAL MEDICINE

## 2024-06-25 PROCEDURE — 3077F SYST BP >= 140 MM HG: CPT | Mod: HCNC,CPTII,S$GLB, | Performed by: INTERNAL MEDICINE

## 2024-06-25 PROCEDURE — 1101F PT FALLS ASSESS-DOCD LE1/YR: CPT | Mod: HCNC,CPTII,S$GLB, | Performed by: INTERNAL MEDICINE

## 2024-06-25 PROCEDURE — 99999 PR PBB SHADOW E&M-EST. PATIENT-LVL IV: CPT | Mod: PBBFAC,HCNC,, | Performed by: INTERNAL MEDICINE

## 2024-06-25 PROCEDURE — 99214 OFFICE O/P EST MOD 30 MIN: CPT | Mod: HCNC,S$GLB,, | Performed by: INTERNAL MEDICINE

## 2024-06-25 PROCEDURE — 3288F FALL RISK ASSESSMENT DOCD: CPT | Mod: HCNC,CPTII,S$GLB, | Performed by: INTERNAL MEDICINE

## 2024-06-25 RX ORDER — ROSUVASTATIN CALCIUM 5 MG/1
5 TABLET, COATED ORAL DAILY
Qty: 90 TABLET | Refills: 2 | Status: SHIPPED | OUTPATIENT
Start: 2024-06-25

## 2024-06-25 RX ORDER — LOSARTAN POTASSIUM 25 MG/1
25 TABLET ORAL DAILY
Qty: 30 TABLET | Refills: 1 | Status: SHIPPED | OUTPATIENT
Start: 2024-06-25 | End: 2025-06-25

## 2024-06-25 RX ORDER — TRIAMCINOLONE ACETONIDE 1 MG/G
CREAM TOPICAL 2 TIMES DAILY
Qty: 15 G | Refills: 0 | Status: SHIPPED | OUTPATIENT
Start: 2024-06-25 | End: 2024-07-02

## 2024-06-25 RX ORDER — METFORMIN HYDROCHLORIDE 500 MG/1
1500 TABLET, EXTENDED RELEASE ORAL DAILY
Qty: 270 TABLET | Refills: 2 | Status: SHIPPED | OUTPATIENT
Start: 2024-06-25

## 2024-06-25 NOTE — PROGRESS NOTES
Subjective:       Patient ID: Jacqui Luther is a 83 y.o. female.    Chief Complaint: Follow-up   Follow-up  Associated symptoms include a rash. Pertinent negatives include no chest pain or joint swelling.     Gyn: no defer age   MMG:  LV 12/2023   Dexa: yes LV 2023  normal   Colonoscopy:  Dr Callahan 8/2020 polyp/ r/c as needed   Immunizations: Flu: Y Tdap: 2022 Pneumovax: 2021  Prevnar 13: 2020  Shingles: old   Smoker: no   Eye: Dr Zamorano 5/2022 + dry eye.   Scanned N       started walking 1/2 mile and has been weeding yard     149/70     Foot rash round w raised areas red 3 days. No bite. Never had before. No itching has gotten bigger.        Paroxysmal Afib:  Loop recorder picked up episode while sleeping - 48 min episode.  Rx Sotolo 80 bid, Eliquis 5 bid     Hypertension:  Add losartan 25     TIA:  Admit LV. Witnessed. Seen Neuro [facility].   S/p Emg BLE and mild numb lat feet.                Loop recorder: 4/2023.  Kathleen stress   Atherosclerosis arteries: Dr Hurley     Type 2 Diabetes: controlled //  A1c 7.0  G 171 // Rx: Metformin ER 1500 daily      Mixed HLD:  LDL goal <70 // improved L 81. //  Rx Crestor 5   SE  Myalgia statin higher dose      CKD stage 2/3: Gfr stable. 50-56 // Cr 0.9-1.1   Avoid NSAIDS      Elevated cyclic LFT: cyclic AST/AL 48-60/68-88      Review CT 2019 shows benign left adrenal adenoma, B kidney cyst.  Hx Kidney stones. Has had IVP in past      ____________________________________________________________________________________________________  Assessment & Plan:  1. Rash and nonspecific skin eruption  - triamcinolone acetonide 0.1% (KENALOG) 0.1 % cream; Apply topically 2 (two) times daily. for 7 days  Dispense: 15 g; Refill: 0  - Ambulatory referral/consult to Dermatology; Future    2. Primary hypertension  - TSH; Future  - losartan (COZAAR) 25 MG tablet; Take 1 tablet (25 mg total) by mouth once daily.  Dispense: 30 tablet; Refill: 1    3. DM type 2 with diabetic mixed  hyperlipidemia  - Comprehensive Metabolic Panel; Future  - Hemoglobin A1C; Future  - Lipid Panel; Future  - Microalbumin/Creatinine Ratio, Urine; Future  - Urinalysis; Future    4. Elevated liver enzymes    5. Chronic kidney disease, stage 3a    6. Mixed hyperlipidemia    7. Myalgia due to statin    8. Medicare annual wellness visit, subsequent  - CBC Without Differential; Future  - Comprehensive Metabolic Panel; Future  - Hemoglobin A1C; Future  - Lipid Panel; Future  - Microalbumin/Creatinine Ratio, Urine; Future  - TSH; Future  - Urinalysis; Future    9. Abnormal CBC  - CBC Without Differential; Future     Rash and nonspecific skin eruption  -     triamcinolone acetonide 0.1% (KENALOG) 0.1 % cream; Apply topically 2 (two) times daily. for 7 days  Dispense: 15 g; Refill: 0  -     Ambulatory referral/consult to Dermatology; Future; Expected date: 07/02/2024    Primary hypertension  Comments:  high last 3 days add Losartan 1/2- 1 daily  Orders:  -     TSH; Future; Expected date: 06/25/2024  -     losartan (COZAAR) 25 MG tablet; Take 1 tablet (25 mg total) by mouth once daily.  Dispense: 30 tablet; Refill: 1    DM type 2 with diabetic mixed hyperlipidemia  -     Comprehensive Metabolic Panel; Future; Expected date: 06/25/2024  -     Hemoglobin A1C; Future; Expected date: 06/25/2024  -     Lipid Panel; Future; Expected date: 06/25/2024  -     Microalbumin/Creatinine Ratio, Urine; Future; Expected date: 06/25/2024  -     Urinalysis; Future; Expected date: 06/25/2024    Elevated liver enzymes    Chronic kidney disease, stage 3a    Mixed hyperlipidemia    Myalgia due to statin    Medicare annual wellness visit, subsequent  -     CBC Without Differential; Future; Expected date: 06/25/2024  -     Comprehensive Metabolic Panel; Future; Expected date: 06/25/2024  -     Hemoglobin A1C; Future; Expected date: 06/25/2024  -     Lipid Panel; Future; Expected date: 06/25/2024  -     Microalbumin/Creatinine Ratio, Urine; Future;  Expected date: 06/25/2024  -     TSH; Future; Expected date: 06/25/2024  -     Urinalysis; Future; Expected date: 06/25/2024    Abnormal CBC  -     CBC Without Differential; Future; Expected date: 06/25/2024        Continue to work on regular exercise, maintain healthy weight, balanced diet. Avoid unhealthy habits: smoking, excessive alcohol intake.     Disclaimer: This note was partly generated using dictation software which may occasionally result in transcription errors  ____________________________________________________________________________________________________  Review of Systems:  Review of Systems   Constitutional:  Negative for appetite change.   HENT:  Negative for nosebleeds.    Eyes:  Negative for pain.   Respiratory:  Negative for choking.    Cardiovascular:  Negative for chest pain.   Gastrointestinal:  Negative for blood in stool.   Genitourinary:  Negative for hematuria.   Musculoskeletal:  Negative for joint swelling.   Skin:  Positive for rash. Negative for pallor.   Neurological:  Negative for facial asymmetry.   Hematological:  Does not bruise/bleed easily.   Psychiatric/Behavioral:  Negative for confusion.        Objective:     Wt Readings from Last 3 Encounters:   06/25/24 84.6 kg (186 lb 8.2 oz)   05/20/24 85 kg (187 lb 6.3 oz)   03/13/24 86 kg (189 lb 9.5 oz)     BP Readings from Last 3 Encounters:   06/25/24 (!) 154/62   05/20/24 130/76   01/04/24 138/68       Lab Results   Component Value Date    WBC 7.18 12/28/2023    HGB 13.0 12/28/2023    HCT 39.9 12/28/2023     12/28/2023     06/18/2024    K 4.6 06/18/2024     06/18/2024    ALT 68 (H) 06/18/2024    AST 48 (H) 06/18/2024    CO2 21 (L) 06/18/2024    CREATININE 1.0 06/18/2024    BUN 14 06/18/2024     (H) 06/18/2024      Hemoglobin A1C   Date Value Ref Range Status   06/18/2024 7.0 (H) 4.0 - 5.6 % Final     Comment:     ADA Screening Guidelines:  5.7-6.4%  Consistent with prediabetes  >or=6.5%  Consistent  "with diabetes    High levels of fetal hemoglobin interfere with the HbA1C  assay. Heterozygous hemoglobin variants (HbS, HgC, etc)do  not significantly interfere with this assay.   However, presence of multiple variants may affect accuracy.     12/28/2023 7.2 (H) 4.0 - 5.6 % Final     Comment:     ADA Screening Guidelines:  5.7-6.4%  Consistent with prediabetes  >or=6.5%  Consistent with diabetes    High levels of fetal hemoglobin interfere with the HbA1C  assay. Heterozygous hemoglobin variants (HbS, HgC, etc)do  not significantly interfere with this assay.   However, presence of multiple variants may affect accuracy.     06/22/2023 7.2 (H) 4.0 - 5.6 % Final     Comment:     ADA Screening Guidelines:  5.7-6.4%  Consistent with prediabetes  >or=6.5%  Consistent with diabetes    High levels of fetal hemoglobin interfere with the HbA1C  assay. Heterozygous hemoglobin variants (HbS, HgC, etc)do  not significantly interfere with this assay.   However, presence of multiple variants may affect accuracy.        Lab Results   Component Value Date    TSH 1.66 05/25/2022     No results found for: "FREET4"  Lab Results   Component Value Date    LDLCALC 87.8 06/18/2024    LDLCALC 94.4 12/28/2023    LDLCALC 81.2 06/22/2023     Lab Results   Component Value Date    TRIG 161 (H) 06/18/2024    TRIG 113 12/28/2023    TRIG 129 06/22/2023            Physical Exam  Constitutional:       Appearance: Normal appearance.   HENT:      Head: Normocephalic and atraumatic.   Eyes:      Extraocular Movements: Extraocular movements intact.      Pupils: Pupils are equal, round, and reactive to light.   Cardiovascular:      Rate and Rhythm: Normal rate and regular rhythm.   Pulmonary:      Effort: Pulmonary effort is normal.   Musculoskeletal:        Feet:    Neurological:      Mental Status: She is alert and oriented to person, place, and time.   Psychiatric:         Mood and Affect: Mood normal.         Medication List with Changes/Refills   New " Medications    LOSARTAN (COZAAR) 25 MG TABLET    Take 1 tablet (25 mg total) by mouth once daily.    TRIAMCINOLONE ACETONIDE 0.1% (KENALOG) 0.1 % CREAM    Apply topically 2 (two) times daily. for 7 days   Current Medications    AMOXICILLIN (AMOXIL) 500 MG CAPSULE    amoxicillin 500 mg capsule   TAKE FOUR CAPSULES BY MOUTH 30 60 MINUTES BEFORE DENTAL WORK    ASPIRIN 81 MG CAP    Take 81 mg by mouth once.    CARBOXYMETHYLCELLULOSE (REFRESH PLUS) 0.5 % DPET    1 drop 3 (three) times daily as needed.    CLOPIDOGREL (PLAVIX) 75 MG TABLET        COENZYME Q10 200 MG CAPSULE    Take 200 mg by mouth once daily.    ELIQUIS 5 MG TAB    Take 5 mg by mouth 2 (two) times daily.    FISH,BORA,FLAX OILS-OM3,6,9 #1 1,200 MG CAP    Take by mouth. 3 capsules daily    GLUCOSAMINE/CHONDR FULLER A SOD (OSTEO BI-FLEX ORAL)    Take 2 capsules by mouth once daily.    LATANOPROST 0.005 % OPHTHALMIC SOLUTION    Place 1 drop into both eyes every evening.    METFORMIN (GLUCOPHAGE-XR) 500 MG ER 24HR TABLET    Take 3 tablets by mouth once daily    MULTIVITAMIN WITH MINERALS TABLET    Take 1 tablet by mouth once daily.    ROSUVASTATIN (CRESTOR) 5 MG TABLET    Take 1 tablet by mouth once daily    SOTALOL (BETAPACE) 80 MG TABLET    Take 80 mg by mouth 2 (two) times daily.    TURMERIC (CURCUMIN MISC)    1 capsule by Misc.(Non-Drug; Combo Route) route.

## 2024-09-16 DIAGNOSIS — I10 PRIMARY HYPERTENSION: ICD-10-CM

## 2024-09-16 NOTE — TELEPHONE ENCOUNTER
Care Due:                  Date            Visit Type   Department     Provider  --------------------------------------------------------------------------------                                EP -                              [facility]  Last Visit: 06-      CARE ([facility])   MEDICINE       Willis Womack                               -                              [facility]  Next Visit: 12-      CARE ([facility])   MEDICINE       Willis Womack                                                            Last  Test          Frequency    Reason                     Performed    Due Date  --------------------------------------------------------------------------------    HBA1C.......  6 months...  metFORMIN................  06-   12-    [facility] Embedded Care Due Messages. Reference number: [id number].   9/16/2024 5:16:24 PM CDT

## 2024-09-17 RX ORDER — LOSARTAN POTASSIUM 25 MG/1
25 TABLET ORAL
Qty: 30 TABLET | Refills: 4 | Status: SHIPPED | OUTPATIENT
Start: 2024-09-17

## 2024-09-17 NOTE — TELEPHONE ENCOUNTER
Refill Routing Note   Medication(s) are not appropriate for processing by [facility] for the following reason(s):        Required vitals abnormal    [facility] action(s):  Defer        Medication Therapy Plan: FLOS      Appointments  past 12m or future 3m with PCP    Date Provider   Last Visit   6/25/2024 Willis Womack MD   Next Visit   12/26/2024 Willis Womack MD   ED visits in past 90 days: 0        Note composed:2:31 PM 09/17/2024

## 2024-09-18 ENCOUNTER — TELEPHONE (OUTPATIENT)
Dept: FAMILY MEDICINE | Facility: CLINIC | Age: 84
End: 2024-09-18
Payer: MEDICARE

## 2024-09-18 DIAGNOSIS — R92.8 ABNORMAL MAMMOGRAM OF LEFT BREAST: Primary | ICD-10-CM

## 2024-09-18 NOTE — TELEPHONE ENCOUNTER
----- Message from Jaun Heath sent at 9/18/2024  9:51 AM CDT -----  Contact: Self  Type:  Mammogram    Caller is requesting to schedule their annual mammogram appointment.  Order is not listed in EPIC.  Please enter order and contact patient to schedule.    Name of Caller:  Patient  Where would they like the mammogram performed?  [facility] Call Back Number:  [phone]   Additional Information: Called for orders for breast US and Dx mammo

## 2024-12-11 ENCOUNTER — TELEPHONE (OUTPATIENT)
Dept: FAMILY MEDICINE | Facility: CLINIC | Age: 84
End: 2024-12-11

## 2024-12-11 DIAGNOSIS — Z12.31 VISIT FOR SCREENING MAMMOGRAM: Primary | ICD-10-CM

## 2024-12-11 NOTE — TELEPHONE ENCOUNTER
"[facility] stating pt "is scheduled for a L breast mammogram 12/16/24. She is due for bilateral - Please send order for bilateral"    Not sure if it is supposed to be screen or diag. Please review and put in order. We will fax to:    [facility]  [address]  [phone]  "

## 2024-12-19 ENCOUNTER — LAB VISIT (OUTPATIENT)
Dept: LAB | Facility: HOSPITAL | Age: 84
End: 2024-12-19
Payer: MEDICARE

## 2024-12-19 DIAGNOSIS — R79.89 ABNORMAL CBC: ICD-10-CM

## 2024-12-19 DIAGNOSIS — Z00.00 MEDICARE ANNUAL WELLNESS VISIT, SUBSEQUENT: ICD-10-CM

## 2024-12-19 DIAGNOSIS — E78.2 DM TYPE 2 WITH DIABETIC MIXED HYPERLIPIDEMIA: ICD-10-CM

## 2024-12-19 DIAGNOSIS — E11.69 DM TYPE 2 WITH DIABETIC MIXED HYPERLIPIDEMIA: ICD-10-CM

## 2024-12-19 DIAGNOSIS — I10 PRIMARY HYPERTENSION: ICD-10-CM

## 2024-12-19 LAB
ALBUMIN SERPL BCP-MCNC: 3.9 G/DL (ref 3.5–5.2)
ALP SERPL-CCNC: 62 U/L (ref 40–150)
ALT SERPL W/O P-5'-P-CCNC: 36 U/L (ref 10–44)
ANION GAP SERPL CALC-SCNC: 8 MMOL/L (ref 8–16)
AST SERPL-CCNC: 30 U/L (ref 10–40)
BILIRUB SERPL-MCNC: 0.4 MG/DL (ref 0.1–1)
BUN SERPL-MCNC: 19 MG/DL (ref 8–23)
CALCIUM SERPL-MCNC: 9.6 MG/DL (ref 8.7–10.5)
CHLORIDE SERPL-SCNC: 111 MMOL/L (ref 95–110)
CHOLEST SERPL-MCNC: 157 MG/DL (ref 120–199)
CHOLEST/HDLC SERPL: 3.1 {RATIO} (ref 2–5)
CO2 SERPL-SCNC: 21 MMOL/L (ref 23–29)
CREAT SERPL-MCNC: 1 MG/DL (ref 0.5–1.4)
ERYTHROCYTE [DISTWIDTH] IN BLOOD BY AUTOMATED COUNT: 13 % (ref 11.5–14.5)
EST. GFR  (NO RACE VARIABLE): 55.9 ML/MIN/1.73 M^2
ESTIMATED AVG GLUCOSE: 148 MG/DL (ref 68–131)
GLUCOSE SERPL-MCNC: 157 MG/DL (ref 70–110)
HBA1C MFR BLD: 6.8 % (ref 4–5.6)
HCT VFR BLD AUTO: 38.7 % (ref 37–48.5)
HDLC SERPL-MCNC: 50 MG/DL (ref 40–75)
HDLC SERPL: 31.8 % (ref 20–50)
HGB BLD-MCNC: 12.2 G/DL (ref 12–16)
LDLC SERPL CALC-MCNC: 82.2 MG/DL (ref 63–159)
MCH RBC QN AUTO: 29.5 PG (ref 27–31)
MCHC RBC AUTO-ENTMCNC: 31.5 G/DL (ref 32–36)
MCV RBC AUTO: 94 FL (ref 82–98)
NONHDLC SERPL-MCNC: 107 MG/DL
PLATELET # BLD AUTO: 250 K/UL (ref 150–450)
PMV BLD AUTO: 11.3 FL (ref 9.2–12.9)
POTASSIUM SERPL-SCNC: 4.6 MMOL/L (ref 3.5–5.1)
PROT SERPL-MCNC: 6.8 G/DL (ref 6–8.4)
RBC # BLD AUTO: 4.13 M/UL (ref 4–5.4)
SODIUM SERPL-SCNC: 140 MMOL/L (ref 136–145)
TRIGL SERPL-MCNC: 124 MG/DL (ref 30–150)
TSH SERPL DL<=0.005 MIU/L-ACNC: 2.57 UIU/ML (ref 0.4–4)
WBC # BLD AUTO: 6.43 K/UL (ref 3.9–12.7)

## 2024-12-19 PROCEDURE — 80061 LIPID PANEL: CPT | Performed by: INTERNAL MEDICINE

## 2024-12-19 PROCEDURE — 85027 COMPLETE CBC AUTOMATED: CPT | Performed by: INTERNAL MEDICINE

## 2024-12-19 PROCEDURE — 84443 ASSAY THYROID STIM HORMONE: CPT | Performed by: INTERNAL MEDICINE

## 2024-12-19 PROCEDURE — 80053 COMPREHEN METABOLIC PANEL: CPT | Performed by: INTERNAL MEDICINE

## 2024-12-19 PROCEDURE — 36415 COLL VENOUS BLD VENIPUNCTURE: CPT | Mod: PO | Performed by: INTERNAL MEDICINE

## 2024-12-19 PROCEDURE — 83036 HEMOGLOBIN GLYCOSYLATED A1C: CPT | Performed by: INTERNAL MEDICINE

## 2024-12-26 ENCOUNTER — OFFICE VISIT (OUTPATIENT)
Dept: FAMILY MEDICINE | Facility: CLINIC | Age: 84
End: 2024-12-26
Payer: MEDICARE

## 2024-12-26 ENCOUNTER — PATIENT OUTREACH (OUTPATIENT)
Dept: ADMINISTRATIVE | Facility: HOSPITAL | Age: 84
End: 2024-12-26
Payer: MEDICARE

## 2024-12-26 VITALS
BODY MASS INDEX: 33.41 KG/M2 | HEIGHT: 62 IN | OXYGEN SATURATION: 97 % | TEMPERATURE: 98 F | WEIGHT: 181.56 LBS | HEART RATE: 64 BPM | SYSTOLIC BLOOD PRESSURE: 102 MMHG | DIASTOLIC BLOOD PRESSURE: 68 MMHG

## 2024-12-26 DIAGNOSIS — Z00.00 MEDICARE ANNUAL WELLNESS VISIT, SUBSEQUENT: ICD-10-CM

## 2024-12-26 DIAGNOSIS — M79.10 MYALGIA DUE TO STATIN: ICD-10-CM

## 2024-12-26 DIAGNOSIS — Z98.890 S/P ABLATION OF ATRIAL FIBRILLATION: ICD-10-CM

## 2024-12-26 DIAGNOSIS — I10 PRIMARY HYPERTENSION: ICD-10-CM

## 2024-12-26 DIAGNOSIS — R19.7 DIARRHEA, UNSPECIFIED TYPE: ICD-10-CM

## 2024-12-26 DIAGNOSIS — E11.69 DM TYPE 2 WITH DIABETIC MIXED HYPERLIPIDEMIA: Primary | ICD-10-CM

## 2024-12-26 DIAGNOSIS — T46.6X5A MYALGIA DUE TO STATIN: ICD-10-CM

## 2024-12-26 DIAGNOSIS — I48.0 PAROXYSMAL ATRIAL FIBRILLATION: ICD-10-CM

## 2024-12-26 DIAGNOSIS — Z86.79 S/P ABLATION OF ATRIAL FIBRILLATION: ICD-10-CM

## 2024-12-26 DIAGNOSIS — E78.2 DM TYPE 2 WITH DIABETIC MIXED HYPERLIPIDEMIA: Primary | ICD-10-CM

## 2024-12-26 DIAGNOSIS — E66.811 CLASS 1 OBESITY WITH SERIOUS COMORBIDITY AND BODY MASS INDEX (BMI) OF 33.0 TO 33.9 IN ADULT, UNSPECIFIED OBESITY TYPE: ICD-10-CM

## 2024-12-26 PROCEDURE — 99214 OFFICE O/P EST MOD 30 MIN: CPT | Mod: HCNC,S$GLB,, | Performed by: INTERNAL MEDICINE

## 2024-12-26 PROCEDURE — 1159F MED LIST DOCD IN RCRD: CPT | Mod: HCNC,CPTII,S$GLB, | Performed by: INTERNAL MEDICINE

## 2024-12-26 PROCEDURE — 99999 PR PBB SHADOW E&M-EST. PATIENT-LVL V: CPT | Mod: PBBFAC,HCNC,, | Performed by: INTERNAL MEDICINE

## 2024-12-26 PROCEDURE — 1126F AMNT PAIN NOTED NONE PRSNT: CPT | Mod: HCNC,CPTII,S$GLB, | Performed by: INTERNAL MEDICINE

## 2024-12-26 PROCEDURE — 3074F SYST BP LT 130 MM HG: CPT | Mod: HCNC,CPTII,S$GLB, | Performed by: INTERNAL MEDICINE

## 2024-12-26 PROCEDURE — 3288F FALL RISK ASSESSMENT DOCD: CPT | Mod: HCNC,CPTII,S$GLB, | Performed by: INTERNAL MEDICINE

## 2024-12-26 PROCEDURE — G2211 COMPLEX E/M VISIT ADD ON: HCPCS | Mod: HCNC,S$GLB,, | Performed by: INTERNAL MEDICINE

## 2024-12-26 PROCEDURE — 1101F PT FALLS ASSESS-DOCD LE1/YR: CPT | Mod: HCNC,CPTII,S$GLB, | Performed by: INTERNAL MEDICINE

## 2024-12-26 PROCEDURE — 3078F DIAST BP <80 MM HG: CPT | Mod: HCNC,CPTII,S$GLB, | Performed by: INTERNAL MEDICINE

## 2024-12-26 PROCEDURE — 1160F RVW MEDS BY RX/DR IN RCRD: CPT | Mod: HCNC,CPTII,S$GLB, | Performed by: INTERNAL MEDICINE

## 2024-12-26 RX ORDER — DAPAGLIFLOZIN 5 MG/1
5 TABLET, FILM COATED ORAL DAILY
Qty: 30 TABLET | Refills: 6 | Status: SHIPPED | OUTPATIENT
Start: 2024-12-26

## 2024-12-26 NOTE — LETTER
AUTHORIZATION FOR RELEASE OF   CONFIDENTIAL INFORMATION        We are seeing Jacqui Luther, date of birth 1940, in the clinic at [facility]. Willis Womack MD is the patient's PCP. Jacqui Luther has an outstanding lab/procedure at the time we reviewed her chart. In order to help keep her health information updated, she has authorized us to request the following medical record(s):                                              ( x )  EYE EXAM                Please fax records to [facility], Simon-Davis, Brandon D., MD,  at [phone] or email to [email or web address].               Patient Name: Jacqui Luther  : 1940  Patient Phone #: [phone]

## 2024-12-26 NOTE — PROGRESS NOTES
Subjective:       Patient ID: Jacqui Luther is a 84 y.o. female.    Chief Complaint: Follow-up   HPI     History of Present Illness              Gyn: no defer age   MMG:  [facility] 12/2023   Dexa: yes LV 2023  normal   Colonoscopy:  Dr Callahan 8/2020 polyp/ r/c as needed   Immunizations: Flu: Y Tdap: 2022 Prevnar 13: 2020  Shingles: Y   Smoker: no   [facility]: [facility]  5/2022 + dry eye. glaucoma suspect + drops   Scanned N       F/u     Ablation completed Nov 2024 Dr Garland   Paroxysmal Afib:  + Loop recorder.   Rx Sotolo 120 bid, Eliquis 5 bid               11/24 stress testing negative ischemia abnormal wall motion EF 48 negative arrhythmia     Hypertension:  Add losartan 25 prn if elevated      TIA:  Admit LV. Witnessed. Mgmt [facility].   S/p Emg BLE and mild numb lat feet.                Loop recorder: 4/2023.  Kathleen stress   Atherosclerosis arteries: Dr Hurley     Type 2 Diabetes: controlled //  A1c  6.8 home logs never less than 140 // Rx: Metformin ER 1500 daily- + multi diarrhea.  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 50-56 // Cr 0.9-1.1              Avoid NSAIDS      Elevated cyclic LFT: cyclic AST/AL 48-60/68-88      Review CT 2019 shows benign left adrenal adenoma, B kidney cyst.  Hx Kidney stones. Has had IVP in past         ____________________________________________________________________________________________________  Assessment & Plan:  1. DM type 2 with diabetic mixed hyperlipidemia  - FARXIGA 5 mg Tab tablet; Take 1 tablet (5 mg total) by mouth once daily.  Dispense: 30 tablet; Refill: 6  - Comprehensive Metabolic Panel; Future  - Hemoglobin A1C; Future  - Lipid Panel; Future    2. Diarrhea, unspecified type    3. Primary hypertension    4. Paroxysmal atrial fibrillation    5. Myalgia due to statin    6. S/P ablation of atrial fibrillation    7. Class  1 obesity with serious comorbidity and body mass index (BMI) of 33.0 to 33.9 in adult, unspecified obesity type    8. Medicare annual wellness visit, subsequent  - Comprehensive Metabolic Panel; Future  - Hemoglobin A1C; Future  - Lipid Panel; Future     DM type 2 with diabetic mixed hyperlipidemia  -     FARXIGA 5 mg Tab tablet; Take 1 tablet (5 mg total) by mouth once daily.  Dispense: 30 tablet; Refill: 6  -     Comprehensive Metabolic Panel; Future; Expected date: 12/26/2024  -     Hemoglobin A1C; Future; Expected date: 12/26/2024  -     Lipid Panel; Future; Expected date: 12/26/2024    Diarrhea, unspecified type  Comments:  change metfromin to 2 tab    Primary hypertension    Paroxysmal atrial fibrillation    Myalgia due to statin    S/P ablation of atrial fibrillation    Class 1 obesity with serious comorbidity and body mass index (BMI) of 33.0 to 33.9 in adult, unspecified obesity type    Medicare annual wellness visit, subsequent  -     Comprehensive Metabolic Panel; Future; Expected date: 12/26/2024  -     Hemoglobin A1C; Future; Expected date: 12/26/2024  -     Lipid Panel; Future; Expected date: 12/26/2024        Continue to work on maintain healthy weight, balanced diet. Avoid unhealthy habits: smoking/vaping, excessive alcohol intake.     Recommend diet exercise:  High protein, low fat, low carb diet - calories women under <1200 & men <1800, carbs <100 G, protein 50-60 G daily. Protein supplements to replace one meal.  Keep all beverages < 10 calories per serving. Keep snacks < 100 calories.   Recommend exercise 160 minutes per week, combo of cardio and weight/strength training.     Visit today included increased complexity associated with the care of the episodic problem addressed and managing the longitudinal care of the patient due to the serious and/or complex managed problem(s).  Dm     Disclaimer: This note was partly generated using dictation software which may occasionally result in  transcription errors  ____________________________________________________________________________________________________  Review of Systems:  Review of Systems      Negative     Objective:     Wt Readings from Last 3 Encounters:   12/26/24 82.3 kg (181 lb 8.8 oz)   08/29/24 84.6 kg (186 lb 8.2 oz)   07/17/24 84.6 kg (186 lb 8.2 oz)     BP Readings from Last 3 Encounters:   12/26/24 102/68   06/25/24 (!) 154/62   05/20/24 130/76       Lab Results   Component Value Date    WBC 6.43 12/19/2024    HGB 12.2 12/19/2024    HCT 38.7 12/19/2024     12/19/2024     12/19/2024    K 4.6 12/19/2024     (H) 12/19/2024    ALT 36 12/19/2024    AST 30 12/19/2024    CO2 21 (L) 12/19/2024    CREATININE 1.0 12/19/2024    BUN 19 12/19/2024     (H) 12/19/2024      Hemoglobin A1C   Date Value Ref Range Status   12/19/2024 6.8 (H) 4.0 - 5.6 % Final     Comment:     ADA Screening Guidelines:  5.7-6.4%  Consistent with prediabetes  >or=6.5%  Consistent with diabetes    High levels of fetal hemoglobin interfere with the HbA1C  assay. Heterozygous hemoglobin variants (HbS, HgC, etc)do  not significantly interfere with this assay.   However, presence of multiple variants may affect accuracy.     06/18/2024 7.0 (H) 4.0 - 5.6 % Final     Comment:     ADA Screening Guidelines:  5.7-6.4%  Consistent with prediabetes  >or=6.5%  Consistent with diabetes    High levels of fetal hemoglobin interfere with the HbA1C  assay. Heterozygous hemoglobin variants (HbS, HgC, etc)do  not significantly interfere with this assay.   However, presence of multiple variants may affect accuracy.     12/28/2023 7.2 (H) 4.0 - 5.6 % Final     Comment:     ADA Screening Guidelines:  5.7-6.4%  Consistent with prediabetes  >or=6.5%  Consistent with diabetes    High levels of fetal hemoglobin interfere with the HbA1C  assay. Heterozygous hemoglobin variants (HbS, HgC, etc)do  not significantly interfere with this assay.   However, presence of  "multiple variants may affect accuracy.        Lab Results   Component Value Date    TSH 2.567 12/19/2024    TSH 1.66 05/25/2022     No results found for: "FREET4"  Lab Results   Component Value Date    LDLCALC 82.2 12/19/2024    LDLCALC 87.8 06/18/2024    LDLCALC 94.4 12/28/2023     Lab Results   Component Value Date    TRIG 124 12/19/2024    TRIG 161 (H) 06/18/2024    TRIG 113 12/28/2023            Physical Exam      Constitutional:       Appearance: Normal appearance.   HENT:      Head: Normocephalic and atraumatic.   Eyes:      Extraocular Movements: Extraocular movements intact.      Pupils: Pupils are equal, round, and reactive to light.   Cardiovascular:      Rate and Rhythm: Normal rate.   Pulmonary:      Effort: Pulmonary effort is normal.   Neurological:      Mental Status: She is alert and oriented to person, place, and time.   Psychiatric:         Mood and Affect: Mood normal.     Medication List with Changes/Refills   New Medications    FARXIGA 5 MG TAB TABLET    Take 1 tablet (5 mg total) by mouth once daily.   Current Medications    AMOXICILLIN (AMOXIL) 500 MG CAPSULE    amoxicillin 500 mg capsule   TAKE FOUR CAPSULES BY MOUTH 30 60 MINUTES BEFORE DENTAL WORK    CARBOXYMETHYLCELLULOSE (REFRESH PLUS) 0.5 % DPET    1 drop 3 (three) times daily as needed.    CLOPIDOGREL (PLAVIX) 75 MG TABLET        COENZYME Q10 200 MG CAPSULE    Take 200 mg by mouth once daily.    ELIQUIS 5 MG TAB    Take 5 mg by mouth 2 (two) times daily.    FISH,BORA,FLAX OILS-OM3,6,9 #1 1,200 MG CAP    Take by mouth. 3 capsules daily    GLUCOSAMINE/CHONDR FULLER A SOD (OSTEO BI-FLEX ORAL)    Take 2 capsules by mouth once daily.    KETOCONAZOLE (NIZORAL) 2 % CREAM    Apply twice a day to affected areas of skin    LATANOPROST 0.005 % OPHTHALMIC SOLUTION    Place 1 drop into both eyes every evening.    LOSARTAN (COZAAR) 25 MG TABLET    Take 1 tablet by mouth once daily    METFORMIN (GLUCOPHAGE-XR) 500 MG ER 24HR TABLET    Take 3 tablets " (1,500 mg total) by mouth once daily.    MULTIVITAMIN WITH MINERALS TABLET    Take 1 tablet by mouth once daily.    ROSUVASTATIN (CRESTOR) 5 MG TABLET    Take 1 tablet (5 mg total) by mouth once daily.    SOTALOL (BETAPACE) 80 MG TABLET    Take 120 mg by mouth 2 (two) times daily.    TRIAMCINOLONE ACETONIDE 0.1% (KENALOG) 0.1 % CREAM    Apply topically 2 (two) times daily. for 7 days    TURMERIC (CURCUMIN MISC)    1 capsule by Misc.(Non-Drug; Combo Route) route.   Discontinued Medications    TERBINAFINE HCL (LAMISIL) 1 % CREAM    Apply topically 2 (two) times daily.

## 2024-12-30 ENCOUNTER — PATIENT OUTREACH (OUTPATIENT)
Dept: ADMINISTRATIVE | Facility: HOSPITAL | Age: 84
End: 2024-12-30
Payer: MEDICARE

## 2024-12-31 PROBLEM — Z98.890 S/P ABLATION OF ATRIAL FIBRILLATION: Status: ACTIVE | Noted: 2024-12-31

## 2024-12-31 PROBLEM — Z86.79 S/P ABLATION OF ATRIAL FIBRILLATION: Status: ACTIVE | Noted: 2024-12-31

## 2025-01-21 NOTE — Clinical Note
Physical Therapy Visit    Visit Type: Daily Treatment Note  Visit: 3  Referring Provider: Reece Navarrete MD  Medical Diagnosis (from order): M54.2 - Neck pain  M54.12 - Radiculopathy of cervical region  M47.812 - Cervical spondylosis     SUBJECTIVE                                                                                                               Patient reports he had a bad night sleeping at work. He is able to manage his pain at home with medicine and better quality of sleep. 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      OBJECTIVE                                                                                                                     Range of Motion (ROM)   (degrees unless noted; active unless noted; norms in ( ); negative=lacking to 0, positive=beyond 0)  WNL: LLE, RLE  Lumbar WFL, thoracic WFL  Cervical:    - Flexion (45-50): 50% pain    - Extension (45-60): 50% pain    Strength  (out of 5 unless noted, standard test position unless noted)   5/5: LUE, RUE  5/5: LLE, RLE  Lumbar:    - Upper Abdominals: 4     - Lower Abdominals: 4-          Palpation  Decreased c 4 - t 8 mobility  Left scalene and upper trap  muscle spasm  + Diastasis recti abdominis but able to approximate abdominals, bulge of abdominals on intra abdominal pressure     Muscle Flexibility  Tight bilaterally calf muscle                 Treatment     Therapeutic Exercise  Performed to develop strength and improve range of motion for increased independence and improved functional mobility with household chores  Discussed findings on recent MRI, sleeping patterns and positioning, self cervical distraction unit, findings on testing , sitting position changes  See HEP      Manual Therapy   Performed in order to address joint osteokinematics for improved mobility and increased independence with household chores  Muscle energy techniques with grade 2-3 mobility C 4-T 2 to include  [facility]  left facet mobs  Soft tissue mobilization left scalenes  Cervical distraction 10 on 5 sec off 20 reps    Home Exercise Program  Access Code: [email or web address]  URL: [email or web address]  Date: 01/21/2025  Prepared by: Stephanie Lozano    Exercises  - Standing Bilateral Low Shoulder Row with Anchored Resistance  - 1 x daily - 7 x weekly - 3 sets - 10 reps  - Plank with Thoracic Rotation on Counter  - 3 x daily - 7 x weekly - 1 sets - 10 reps  - Standing High Row with Resistance  - 1 x daily - 7 x weekly - 3 sets - 10 reps  - Seated Diaphragmatic Breathing  - 6 x daily - 7 x weekly - 1 sets - 2 reps      ASSESSMENT                                                                                                            Focus of today's treatment was on patient education, demonstration and progressing HEP. Patient with improved mobility post manual as evidenced by improved ROM. Patient educated and demonstration on diaphragmatic breathing, abdominal engagement, sitting posture . Continued skilled therapy is medically necessary for him to facilitate progress towards stated goals.   Education:   - Results of above outlined education: Demonstrates understanding    PLAN                                                                                                                           Suggestions for next session as indicated: Progress per plan of care    Goals  Long Term Goals: to be met by end of plan of care  1. Improve cervical spine ROM to allow patient to drive and sleep with less pain  2. Improve cervical / thoracic spine mobility to allow patient to tolerate overhead activities   3. Improve upper extremity and scapular strength to allow patient to tolerate sitting with less pain and improved posture  4. Patient will be independent with progressed and modified home exercise program.      Therapy procedure time and total treatment time can be found documented on the Time Entry flowsheet

## 2025-02-17 ENCOUNTER — TELEPHONE (OUTPATIENT)
Dept: FAMILY MEDICINE | Facility: CLINIC | Age: 85
End: 2025-02-17
Payer: MEDICARE

## 2025-02-17 ENCOUNTER — TELEPHONE (OUTPATIENT)
Dept: FAMILY MEDICINE | Facility: CLINIC | Age: 85
End: 2025-02-17

## 2025-02-17 NOTE — TELEPHONE ENCOUNTER
----- Message from Tiffanie sent at 2/17/2025  4:15 PM CST -----  Contact: Colleen  Type:  Patient Returning CallWho Called: Shiva/ Sister in lawo Left Message for Patient:  Gisel the patient know what this is regarding?:  yesBest Call Back Number:  [phone] Information:  Call is regarding previous  convo

## 2025-02-17 NOTE — TELEPHONE ENCOUNTER
Spoke w/ pt. She was in [facility] x 3d for GI bleed was transfused w/ 1U blood. Was d/c'd Fri. States she needs and H&H done Fri. She has appt w/ Dr Callahan next Friday and was told to have lab done the week before her appt w/ him and asking if we would put in order sof H&H. Advised her to contact Dr Callahan for the orders, as he may want her to have add'l tests. She will call back after speaking w/ his staff to get lab scheduled. JAZMIN

## 2025-02-17 NOTE — TELEPHONE ENCOUNTER
----- Message from Tiffanie sent at 2/17/2025  4:15 PM CST -----  Contact: Colleen  Type:  Patient Returning CallWho Called: Shiva/ Sister in lawWho Left Message for Patient:  Gisel the patient know what this is regarding?:  yesBest Call Back Number:  [phone] Information:  Call is regarding previous  convo     No answer for Veronica nor pt's phone number. Will darcy when they call back.

## 2025-02-17 NOTE — TELEPHONE ENCOUNTER
----- Message from Jami sent at 2/17/2025 10:22 AM CST -----  Contact: self  Type:  Needs Medical AdviceWho Called: selfSymptoms (please be specific): pt is needing to know if dr can do a specific test on friday,would like ot speak to a nurse asap, please call pt to discuss.Would the patient rather a call back or a response via [facility]? callBest Call Back Number: [phone] Information: please advise and thank you

## 2025-02-18 ENCOUNTER — TELEPHONE (OUTPATIENT)
Dept: FAMILY MEDICINE | Facility: CLINIC | Age: 85
End: 2025-02-18
Payer: MEDICARE

## 2025-02-18 NOTE — TELEPHONE ENCOUNTER
----- Message from Fatou sent at 2/18/2025 10:02 AM CST -----   Type:  Needs Medical AdviceWho Called:  PT/JERMAIN/ Massimo the patient rather a call back or a response via [facility]?   [facility]:[phone]/ [phone] Information:  Please call back to advise. Thank you!

## 2025-02-18 NOTE — TELEPHONE ENCOUNTER
Spoke w/ pt and her [facility]. She states Dr Callahan's staff told them to have her PCP order the labs prior to appt w/ Dr Callahan(see prev msg).   Spoke w/ Cherelle. She has sent this req to their NP for review and states that they will be calling pt once orders are in(if needed).  Spoke w/ pt and SNL and advised. They VU

## 2025-02-21 ENCOUNTER — TELEPHONE (OUTPATIENT)
Dept: FAMILY MEDICINE | Facility: CLINIC | Age: 85
End: 2025-02-21
Payer: MEDICARE

## 2025-02-21 ENCOUNTER — LAB VISIT (OUTPATIENT)
Dept: LAB | Facility: HOSPITAL | Age: 85
End: 2025-02-21
Attending: STUDENT IN AN ORGANIZED HEALTH CARE EDUCATION/TRAINING PROGRAM
Payer: MEDICARE

## 2025-02-21 DIAGNOSIS — N18.31 CHRONIC KIDNEY DISEASE, STAGE 3A: Primary | ICD-10-CM

## 2025-02-21 DIAGNOSIS — N18.31 CHRONIC KIDNEY DISEASE, STAGE 3A: ICD-10-CM

## 2025-02-21 LAB
BASOPHILS # BLD AUTO: 0.06 K/UL (ref 0–0.2)
BASOPHILS NFR BLD: 0.5 % (ref 0–1.9)
DIFFERENTIAL METHOD BLD: ABNORMAL
EOSINOPHIL # BLD AUTO: 0.1 K/UL (ref 0–0.5)
EOSINOPHIL NFR BLD: 0.5 % (ref 0–8)
ERYTHROCYTE [DISTWIDTH] IN BLOOD BY AUTOMATED COUNT: 15.8 % (ref 11.5–14.5)
HCT VFR BLD AUTO: 29.1 % (ref 37–48.5)
HGB BLD-MCNC: 9.1 G/DL (ref 12–16)
IMM GRANULOCYTES # BLD AUTO: 0.05 K/UL (ref 0–0.04)
IMM GRANULOCYTES NFR BLD AUTO: 0.5 % (ref 0–0.5)
LYMPHOCYTES # BLD AUTO: 2.4 K/UL (ref 1–4.8)
LYMPHOCYTES NFR BLD: 22.1 % (ref 18–48)
MCH RBC QN AUTO: 30 PG (ref 27–31)
MCHC RBC AUTO-ENTMCNC: 31.3 G/DL (ref 32–36)
MCV RBC AUTO: 96 FL (ref 82–98)
MONOCYTES # BLD AUTO: 1 K/UL (ref 0.3–1)
MONOCYTES NFR BLD: 9 % (ref 4–15)
NEUTROPHILS # BLD AUTO: 7.4 K/UL (ref 1.8–7.7)
NEUTROPHILS NFR BLD: 67.4 % (ref 38–73)
NRBC BLD-RTO: 0 /100 WBC
PLATELET # BLD AUTO: 370 K/UL (ref 150–450)
PMV BLD AUTO: 10.4 FL (ref 9.2–12.9)
RBC # BLD AUTO: 3.03 M/UL (ref 4–5.4)
WBC # BLD AUTO: 11.04 K/UL (ref 3.9–12.7)

## 2025-02-21 PROCEDURE — 36415 COLL VENOUS BLD VENIPUNCTURE: CPT | Mod: HCNC,PO | Performed by: STUDENT IN AN ORGANIZED HEALTH CARE EDUCATION/TRAINING PROGRAM

## 2025-02-21 PROCEDURE — 85025 COMPLETE CBC W/AUTO DIFF WBC: CPT | Mod: HCNC | Performed by: STUDENT IN AN ORGANIZED HEALTH CARE EDUCATION/TRAINING PROGRAM

## 2025-02-21 NOTE — TELEPHONE ENCOUNTER
Patient is asking for a CBC order to be faxed over to [facility], please sign lab so that we can fax it over.

## 2025-02-21 NOTE — TELEPHONE ENCOUNTER
Pt. And relative, Ms. Macias, in lobby with concerns that CBC lab orders have not been placed as advised per hospital discharge. Pt. Requests that orders be placed prior to her office visit with GI provider, Dr. Callahan, on 2/28/25.  Assured pt. That her pcp, Dr. Womack's staff, will be notified.  Pt. Admitted for GI bleed and acute blood loss anemia on 2/11/25.

## 2025-02-21 NOTE — TELEPHONE ENCOUNTER
----- Message from Shanell sent at 2/21/2025  2:39 PM CST -----  Regarding: CBC order  Patient went to [facility] lab on Friday to do a CBC and she had no orders. The note attached to the visit was that she was going to bring it in or it was to be faxed but lab did not get anything. She is asking for SD to put order in and call when done. Her # is [phone].

## 2025-02-24 ENCOUNTER — RESULTS FOLLOW-UP (OUTPATIENT)
Dept: FAMILY MEDICINE | Facility: CLINIC | Age: 85
End: 2025-02-24

## 2025-02-24 DIAGNOSIS — Z00.00 ENCOUNTER FOR MEDICARE ANNUAL WELLNESS EXAM: ICD-10-CM

## 2025-02-24 NOTE — PROGRESS NOTES
Please contact the patient and let them know that their results showed improvement in hemoglobin levels (although still anemic) compared to last set of labs drawn in hospital.     Some results may be out of reference range but not clinically significant.

## 2025-02-26 ENCOUNTER — TELEPHONE (OUTPATIENT)
Dept: FAMILY MEDICINE | Facility: CLINIC | Age: 85
End: 2025-02-26
Payer: MEDICARE

## 2025-02-26 NOTE — TELEPHONE ENCOUNTER
----- Message from Valencia sent at 2/26/2025  1:17 PM CST -----  Type:  Needs Medical Advice Who Called: Pt  Symptoms (please be specific): NA How long has patient had these symptoms:  NA Pharmacy name and phone #:  NA Would the patient rather a call back or a response via [facility]? Call Back Best Call Back Number: [phone] Additional Information: Pt would like  To Fax Farhad Oneill her lab results that she took on 2/21. Fax # [phone]      Please call Back to advise. Thanks!

## 2025-05-14 ENCOUNTER — TELEPHONE (OUTPATIENT)
Dept: FAMILY MEDICINE | Facility: CLINIC | Age: 85
End: 2025-05-14
Payer: MEDICARE

## 2025-05-14 DIAGNOSIS — E11.9 TYPE 2 DIABETES MELLITUS WITHOUT COMPLICATION, WITHOUT LONG-TERM CURRENT USE OF INSULIN: ICD-10-CM

## 2025-05-14 RX ORDER — METFORMIN HYDROCHLORIDE 500 MG/1
1500 TABLET, EXTENDED RELEASE ORAL
Qty: 270 TABLET | Refills: 0 | Status: SHIPPED | OUTPATIENT
Start: 2025-05-14

## 2025-05-14 NOTE — TELEPHONE ENCOUNTER
Refill Decision Note   Jacqui Luther  is requesting a refill authorization.    Brief Assessment and Rationale for Refill:  Approve       Medication Therapy Plan:  FLOS      Comments:     Note composed:11:16 AM 05/14/2025

## 2025-05-14 NOTE — TELEPHONE ENCOUNTER
----- Message from Olivia sent at 5/14/2025  9:07 AM CDT -----  Name of Who is Calling:ILENE MCKEON [[id number]]  What is the request in detail:Pt requesting a call back to get schedule with the doctor the week of June 16th, 2025 if possible.   Can the clinic reply by [employer]:Call  What Number to Call Back if not in [employer]:Telephone Information:Mobile          [phone]

## 2025-05-14 NOTE — TELEPHONE ENCOUNTER
Care Due:                  Date            Visit Type   Department     Provider  --------------------------------------------------------------------------------                                EP -                              PRIMARY      [facility] FAMILY  Last Visit: 12-      CARE ([facility])   MEDICINE       Willis Womack  Next Visit: None Scheduled  None         None Found                                                            Last  Test          Frequency    Reason                     Performed    Due Date  --------------------------------------------------------------------------------    HBA1C.......  6 months...  FARXIGA, metFORMIN.......  12- 06-    [facility] Embedded Care Due Messages. Reference number: [id number].   5/14/2025 6:52:46 AM CDT

## 2025-05-15 ENCOUNTER — PATIENT OUTREACH (OUTPATIENT)
Dept: ADMINISTRATIVE | Facility: HOSPITAL | Age: 85
End: 2025-05-15
Payer: MEDICARE

## 2025-06-09 ENCOUNTER — LAB VISIT (OUTPATIENT)
Dept: LAB | Facility: HOSPITAL | Age: 85
End: 2025-06-09
Payer: MEDICARE

## 2025-06-09 DIAGNOSIS — E11.69 DM TYPE 2 WITH DIABETIC MIXED HYPERLIPIDEMIA: ICD-10-CM

## 2025-06-09 DIAGNOSIS — E78.2 DM TYPE 2 WITH DIABETIC MIXED HYPERLIPIDEMIA: ICD-10-CM

## 2025-06-09 DIAGNOSIS — Z00.00 MEDICARE ANNUAL WELLNESS VISIT, SUBSEQUENT: ICD-10-CM

## 2025-06-09 LAB
ALBUMIN SERPL BCP-MCNC: 4.1 G/DL (ref 3.5–5.2)
ALP SERPL-CCNC: 68 UNIT/L (ref 40–150)
ALT SERPL W/O P-5'-P-CCNC: 47 UNIT/L (ref 10–44)
ANION GAP (OHS): 9 MMOL/L (ref 8–16)
AST SERPL-CCNC: 41 UNIT/L (ref 11–45)
BILIRUB SERPL-MCNC: 0.3 MG/DL (ref 0.1–1)
BUN SERPL-MCNC: 22 MG/DL (ref 8–23)
CALCIUM SERPL-MCNC: 9.4 MG/DL (ref 8.7–10.5)
CHLORIDE SERPL-SCNC: 110 MMOL/L (ref 95–110)
CHOLEST SERPL-MCNC: 178 MG/DL (ref 120–199)
CHOLEST/HDLC SERPL: 3 {RATIO} (ref 2–5)
CO2 SERPL-SCNC: 21 MMOL/L (ref 23–29)
CREAT SERPL-MCNC: 0.9 MG/DL (ref 0.5–1.4)
EAG (OHS): 177 MG/DL (ref 68–131)
GFR SERPLBLD CREATININE-BSD FMLA CKD-EPI: >60 ML/MIN/1.73/M2
GLUCOSE SERPL-MCNC: 172 MG/DL (ref 70–110)
HBA1C MFR BLD: 7.8 % (ref 4–5.6)
HDLC SERPL-MCNC: 60 MG/DL (ref 40–75)
HDLC SERPL: 33.7 % (ref 20–50)
LDLC SERPL CALC-MCNC: 95.8 MG/DL (ref 63–159)
NONHDLC SERPL-MCNC: 118 MG/DL
POTASSIUM SERPL-SCNC: 5.2 MMOL/L (ref 3.5–5.1)
PROT SERPL-MCNC: 7.3 GM/DL (ref 6–8.4)
SODIUM SERPL-SCNC: 140 MMOL/L (ref 136–145)
TRIGL SERPL-MCNC: 111 MG/DL (ref 30–150)

## 2025-06-09 PROCEDURE — 83036 HEMOGLOBIN GLYCOSYLATED A1C: CPT | Mod: HCNC

## 2025-06-09 PROCEDURE — 80061 LIPID PANEL: CPT | Mod: HCNC

## 2025-06-09 PROCEDURE — 36415 COLL VENOUS BLD VENIPUNCTURE: CPT | Mod: HCNC,PO

## 2025-06-09 PROCEDURE — 80053 COMPREHEN METABOLIC PANEL: CPT | Mod: HCNC

## 2025-06-25 ENCOUNTER — OFFICE VISIT (OUTPATIENT)
Dept: FAMILY MEDICINE | Facility: CLINIC | Age: 85
End: 2025-06-25
Payer: MEDICARE

## 2025-06-25 ENCOUNTER — LAB VISIT (OUTPATIENT)
Dept: LAB | Facility: HOSPITAL | Age: 85
End: 2025-06-25
Attending: INTERNAL MEDICINE
Payer: MEDICARE

## 2025-06-25 ENCOUNTER — TELEPHONE (OUTPATIENT)
Dept: FAMILY MEDICINE | Facility: CLINIC | Age: 85
End: 2025-06-25

## 2025-06-25 VITALS
DIASTOLIC BLOOD PRESSURE: 84 MMHG | SYSTOLIC BLOOD PRESSURE: 118 MMHG | BODY MASS INDEX: 32.86 KG/M2 | RESPIRATION RATE: 16 BRPM | WEIGHT: 179.69 LBS | TEMPERATURE: 98 F | HEART RATE: 62 BPM | OXYGEN SATURATION: 96 %

## 2025-06-25 DIAGNOSIS — E11.69 DM TYPE 2 WITH DIABETIC MIXED HYPERLIPIDEMIA: ICD-10-CM

## 2025-06-25 DIAGNOSIS — D50.9 IRON DEFICIENCY ANEMIA, UNSPECIFIED IRON DEFICIENCY ANEMIA TYPE: ICD-10-CM

## 2025-06-25 DIAGNOSIS — M65.331 TRIGGER MIDDLE FINGER OF RIGHT HAND: ICD-10-CM

## 2025-06-25 DIAGNOSIS — E66.811 CLASS 1 OBESITY WITH SERIOUS COMORBIDITY AND BODY MASS INDEX (BMI) OF 32.0 TO 32.9 IN ADULT, UNSPECIFIED OBESITY TYPE: ICD-10-CM

## 2025-06-25 DIAGNOSIS — T46.6X5A MYALGIA DUE TO STATIN: ICD-10-CM

## 2025-06-25 DIAGNOSIS — I48.0 PAROXYSMAL ATRIAL FIBRILLATION: ICD-10-CM

## 2025-06-25 DIAGNOSIS — M79.10 MYALGIA DUE TO STATIN: ICD-10-CM

## 2025-06-25 DIAGNOSIS — E78.2 DM TYPE 2 WITH DIABETIC MIXED HYPERLIPIDEMIA: ICD-10-CM

## 2025-06-25 DIAGNOSIS — I10 PRIMARY HYPERTENSION: ICD-10-CM

## 2025-06-25 DIAGNOSIS — K92.2 ACUTE UPPER GI BLEED: ICD-10-CM

## 2025-06-25 DIAGNOSIS — Z00.00 MEDICARE ANNUAL WELLNESS VISIT, SUBSEQUENT: Primary | ICD-10-CM

## 2025-06-25 DIAGNOSIS — Z00.00 MEDICARE ANNUAL WELLNESS VISIT, SUBSEQUENT: ICD-10-CM

## 2025-06-25 DIAGNOSIS — R92.332 HETEROGENEOUSLY DENSE TISSUE OF LEFT BREAST ON MAMMOGRAPHY: ICD-10-CM

## 2025-06-25 PROBLEM — N18.31 CHRONIC KIDNEY DISEASE, STAGE 3A: Status: RESOLVED | Noted: 2024-01-04 | Resolved: 2025-06-25

## 2025-06-25 PROBLEM — E66.01 SEVERE OBESITY (BMI 35.0-39.9) WITH COMORBIDITY: Status: RESOLVED | Noted: 2021-12-01 | Resolved: 2025-06-25

## 2025-06-25 LAB
ERYTHROCYTE [DISTWIDTH] IN BLOOD BY AUTOMATED COUNT: 16 % (ref 11.5–14.5)
HCT VFR BLD AUTO: 38.6 % (ref 37–48.5)
HGB BLD-MCNC: 12.1 GM/DL (ref 12–16)
IRON SATN MFR SERPL: 24 % (ref 20–50)
IRON SERPL-MCNC: 119 UG/DL (ref 30–160)
MCH RBC QN AUTO: 26.9 PG (ref 27–31)
MCHC RBC AUTO-ENTMCNC: 31.3 G/DL (ref 32–36)
MCV RBC AUTO: 86 FL (ref 82–98)
PLATELET # BLD AUTO: 276 K/UL (ref 150–450)
PMV BLD AUTO: 10.6 FL (ref 9.2–12.9)
RBC # BLD AUTO: 4.49 M/UL (ref 4–5.4)
TIBC SERPL-MCNC: 488 UG/DL (ref 250–450)
TRANSFERRIN SERPL-MCNC: 330 MG/DL (ref 200–375)
WBC # BLD AUTO: 7.87 K/UL (ref 3.9–12.7)

## 2025-06-25 PROCEDURE — G0439 PPPS, SUBSEQ VISIT: HCPCS | Mod: HCNC,S$GLB,, | Performed by: INTERNAL MEDICINE

## 2025-06-25 PROCEDURE — 84466 ASSAY OF TRANSFERRIN: CPT | Mod: HCNC

## 2025-06-25 PROCEDURE — 1159F MED LIST DOCD IN RCRD: CPT | Mod: CPTII,HCNC,S$GLB, | Performed by: INTERNAL MEDICINE

## 2025-06-25 PROCEDURE — 85027 COMPLETE CBC AUTOMATED: CPT | Mod: HCNC

## 2025-06-25 PROCEDURE — 36415 COLL VENOUS BLD VENIPUNCTURE: CPT | Mod: HCNC,PN

## 2025-06-25 PROCEDURE — 2023F DILAT RTA XM W/O RTNOPTHY: CPT | Mod: CPTII,HCNC,S$GLB, | Performed by: INTERNAL MEDICINE

## 2025-06-25 PROCEDURE — 1160F RVW MEDS BY RX/DR IN RCRD: CPT | Mod: CPTII,HCNC,S$GLB, | Performed by: INTERNAL MEDICINE

## 2025-06-25 PROCEDURE — 1125F AMNT PAIN NOTED PAIN PRSNT: CPT | Mod: CPTII,HCNC,S$GLB, | Performed by: INTERNAL MEDICINE

## 2025-06-25 PROCEDURE — 3288F FALL RISK ASSESSMENT DOCD: CPT | Mod: CPTII,HCNC,S$GLB, | Performed by: INTERNAL MEDICINE

## 2025-06-25 PROCEDURE — 1101F PT FALLS ASSESS-DOCD LE1/YR: CPT | Mod: CPTII,HCNC,S$GLB, | Performed by: INTERNAL MEDICINE

## 2025-06-25 PROCEDURE — 99213 OFFICE O/P EST LOW 20 MIN: CPT | Mod: HCNC,25,S$GLB, | Performed by: INTERNAL MEDICINE

## 2025-06-25 PROCEDURE — 3074F SYST BP LT 130 MM HG: CPT | Mod: CPTII,HCNC,S$GLB, | Performed by: INTERNAL MEDICINE

## 2025-06-25 PROCEDURE — 99999 PR PBB SHADOW E&M-EST. PATIENT-LVL V: CPT | Mod: PBBFAC,HCNC,, | Performed by: INTERNAL MEDICINE

## 2025-06-25 PROCEDURE — 3079F DIAST BP 80-89 MM HG: CPT | Mod: CPTII,HCNC,S$GLB, | Performed by: INTERNAL MEDICINE

## 2025-06-25 RX ORDER — LOSARTAN POTASSIUM 25 MG/1
25 TABLET ORAL DAILY
Qty: 90 TABLET | Refills: 2 | Status: SHIPPED | OUTPATIENT
Start: 2025-06-25

## 2025-06-25 RX ORDER — GLIMEPIRIDE 1 MG/1
1 TABLET ORAL
Qty: 90 TABLET | Refills: 2 | Status: SHIPPED | OUTPATIENT
Start: 2025-06-25 | End: 2026-06-25

## 2025-06-25 RX ORDER — METFORMIN HYDROCHLORIDE 500 MG/1
500 TABLET, EXTENDED RELEASE ORAL 2 TIMES DAILY WITH MEALS
Qty: 180 TABLET | Refills: 2 | Status: SHIPPED | OUTPATIENT
Start: 2025-06-25 | End: 2026-06-25

## 2025-06-25 RX ORDER — SOTALOL HYDROCHLORIDE 80 MG/1
80 TABLET ORAL 2 TIMES DAILY
COMMUNITY

## 2025-06-25 RX ORDER — PANTOPRAZOLE SODIUM 40 MG/1
40 TABLET, DELAYED RELEASE ORAL DAILY
COMMUNITY

## 2025-06-25 RX ORDER — METFORMIN HYDROCHLORIDE 500 MG/1
500 TABLET ORAL
COMMUNITY
End: 2025-06-25

## 2025-06-25 NOTE — TELEPHONE ENCOUNTER
Copied from CRM #[id number]. Topic: Appointments - Amb Referral  >> Jun 25, 2025  3:55 PM Arabella wrote:  Type:  Needs Medical Advice    Who Called: pt  Would the patient rather a call back or a response via [facility]? call  Best Call Back Number: [phone]   Additional Information: orders need to be faxed over to [facility]

## 2025-06-25 NOTE — PROGRESS NOTES
The following components were reviewed and updated:  Medical history  Family History  Social history  Allergies and Current Medications  Health Risk Assessment  Health Maintenance  Care Team    HRA: patient feels overall is healthy.  Psychosocial and behavioral risks discussed.  BMI - 32  Weight loss discussed.   Diet - well balanced.   ADL: self sufficient in all  Instrumental ADL: patient is able to manage things like their medications and finances.    Memory or cognitive function - Patient has no issues with either   Ambulates normal. No recent falls.  Exercise - walks yard work   Depression screening is negative.  Hearing--no deficits.  Vision - no deficits.   Incontinence - none  Opiate Screen- Negative     Preventative health needs discussed and patient was given a printed list of what they have received and what they will need with in the next 5-10 years.  Recommendations developed using the USPSTF age appropriate recommendations.  Education, counseling, and referrals were provided as needed.     After Visit Summary printed and given to patient which includes a list of additional screenings\tests needed.     Advanced Care directive:  yes will, I recommend living will & POA   Advanced care planning, including how to pick a person who would make decisions for you if you were unable to make them for yourself, called a health care power of , and what kind of decisions you might make such as use of life sustaining treatments such as ventilators and tube feeding when faced with a life limiting illness recorded on a living will that they will need to know. (How you want to be cared for as you near the end of your natural life    I have reviewed and updated the patient's current list of providers.     In addition to the patient's preventative review and discussion today, the patient also has other issues to discuss today with a separate summary of plan below:     Subjective:       Patient ID: Jacqui King  Homa is a 84 y.o. female.    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     She reports being in her usual state of health until yesterday when she had an acute episode vomiting dark brown-colored emesis. Later in the day she several episodes of explosive diarrhea with dark brown stool. She reports lightheadedness, dizziness and weakness to the point where she was unable to walk. She also reports diaphoresis and nausea. Denies abdominal pain She alerted EMS due to aforementioned symptoms.    She follows with Dr. Callahan from GI. She last underwent colonoscopy about 10 years ago and was found to polyps per her report. She is on apixaban and clopidogrel for atrial fibrillation and TIA respectively. She last took her apixaban last night.    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     Hemoglobin dropped to 6.6 grams/deciliter necessitating 1 unit of PRBCs. She remains on pantoprazole infusion. 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 She was also found to have a nonbleeding gastric ulcer with no stigmata of bleeding which was biopsied.    Assumed care of this patient on 2/14/25. 84-year-old female with known history of paroxysmal atrial fibrillation status post prior ablation, on eliquis,  history of TIA, on plavix, diabetes, hypertension, admitted with GI bleed with acute blood loss anemia. Hemoglobin down to 6.6, received PRBC transfusion. Seen by Gastroenterology and underwent EGD which demonstrated 1 nonbleeding superficial gastric ulcer, 1 bleeding angioectasias duodenal lesion, status post bipolar cautery/treatment. She was monitored closely postprocedure, trend and H&H remained stable without any clinical evidence of ongoing bleeding, tolerating oral diet, no shortness of breath. She did have some intermittent lightheadedness/dizziness and was monitored closely, improved prior to discharge. Of note on admission strep A returned positive, denies any upper respiratory tract symptoms, did have mild leukocytosis and subsequently resolved, short course of empiric antibiotics. Patient cleared by consultant for discharge and appears medically stable for discharge with outpatient follow up. Patient states feels ready for discharge. Instructions on Eliquis and Plavix provided. PPI therapy initiated. Outpatient follow up with PCP and Gastroenterology. Discharge plan including medications, follow up as well as return precautions were reviewed with the patient, she expressed understanding, no questions or concerns. States she is planning to stay with her brother on discharge. RN in room during my encounter.     Discharge examination   Sitting in bed, alert, oriented, regular heart rhythm, on room air, abdomen soft and nontender      Loop recorder shows no  A fib S/p ablation  Nov 24'   Off Eliqus just taking Plavix    History of Present Illness               Gyn: no defer age   MMG:  [facility] 1/25 due L US 6 M recheck density   Dexa:  [facility] 2023  normal   Colonoscopy:  Dr Callahan 8/2020 polyp/ r/c as needed   Immunizations: Flu: Y Tdap: 2022 Prevnar 13: 2020  Shingles: Y   Smoker: no   [facility]  5/2022 + dry eye. glaucoma suspect + drops   Scanned N         Wellness     Cbc was missed    Complains  of right middle finger trigger finger on and off issues would like to see hand surgeon.    Acute anemia due to bleeding ulcer stomach duodenum.  Cauterized while hospitalized.  H&H dropped to 6.6 received 1 unit PRBC.    Now off Eliquis.  Still taking pantoprazole with GI physician     Paroxysmal Afib:  + Loop, s/p Ablation Nov 2024 Dr Garland.     Rx Sotolo 80 bid  Off Eliquis 5 bid (bleed w ulcer catarized 2/25')               11/24 stress testing negative ischemia abnormal wall motion EF 48 negative arrhythmia     Hypertension:  controlled Rx Losartan 25      TIA:  Admit [facility]. Witnessed.  Rx Plavix  Prev Mgmt [facility].  / S/p Emg BLE and mild numb lat feet.              Loop recorder: 4/2023.  Kathleen stress   Atherosclerosis arteries: Dr Hurley     Type 2 Diabetes: uncontrolled //  A1c  7.8 home G is 160-180 // Rx: Metformin ER 1000 daily + multi diarrhea.    $$ didn't get Farxiga, SE: Metformin IR diarrhea, ER > 1000 diarrhea.       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   Cyclic  50-56 // Cr 0.9-1.1              Avoid NSAIDS      Elevated cyclic LFT: cyclic AST/AL 48-60/68-88      Review CT 2019 shows benign left adrenal adenoma, B kidney cyst.  Kidney stones. Recurrent.             ____________________________________________________________________________________________________  Assessment & Plan:  1. Medicare annual wellness visit, subsequent  - CBC Without Differential; Future  - Iron and TIBC; Future  - CBC Without Differential; Future  - Iron and TIBC; Future    2. DM type 2 with diabetic mixed hyperlipidemia  - glimepiride (AMARYL) 1 MG tablet; Take 1 tablet (1 mg total) by mouth before breakfast.  Dispense: 90 tablet; Refill: 2  - metFORMIN (GLUCOPHAGE-XR) 500 MG ER 24hr tablet; Take 1 tablet (500 mg total) by mouth 2 (two) times daily with meals.  Dispense: 180 tablet; Refill: 2    3. Trigger middle finger of  right hand  - Ambulatory referral/consult to General Surgery; Future    4. Acute upper GI bleed    5. Primary hypertension  - losartan (COZAAR) 25 MG tablet; Take 1 tablet (25 mg total) by mouth once daily.  Dispense: 90 tablet; Refill: 2    6. Heterogeneously dense tissue of left breast on mammography  - US Breast Left Complete; Future  - US Breast Left Complete    7. Iron deficiency anemia, unspecified iron deficiency anemia type  - CBC Without Differential; Future  - Iron and TIBC; Future  - CBC Without Differential; Future  - Iron and TIBC; Future    8. Class 1 obesity with serious comorbidity and body mass index (BMI) of 32.0 to 32.9 in adult, unspecified obesity type    9. Myalgia due to statin    10. Paroxysmal atrial fibrillation     Medicare annual wellness visit, subsequent  -     CBC Without Differential; Future; Expected date: 06/25/2025  -     Iron and TIBC; Future; Expected date: 06/25/2025  -     CBC Without Differential; Future; Expected date: 06/25/2025  -     Iron and TIBC; Future; Expected date: 06/25/2025    DM type 2 with diabetic mixed hyperlipidemia  -     glimepiride (AMARYL) 1 MG tablet; Take 1 tablet (1 mg total) by mouth before breakfast.  Dispense: 90 tablet; Refill: 2  -     metFORMIN (GLUCOPHAGE-XR) 500 MG ER 24hr tablet; Take 1 tablet (500 mg total) by mouth 2 (two) times daily with meals.  Dispense: 180 tablet; Refill: 2    Trigger middle finger of right hand  -     Ambulatory referral/consult to General Surgery; Future; Expected date: 07/02/2025    Acute upper GI bleed  Comments:  r/c cbc and iron    Primary hypertension  -     losartan (COZAAR) 25 MG tablet; Take 1 tablet (25 mg total) by mouth once daily.  Dispense: 90 tablet; Refill: 2    Heterogeneously dense tissue of left breast on mammography  Comments:  due 6 M left US  Orders:  -     US Breast Left Complete; Future; Expected date: 06/25/2025    Iron deficiency anemia, unspecified iron deficiency anemia type  -     CBC  Without Differential; Future; Expected date: 06/25/2025  -     Iron and TIBC; Future; Expected date: 06/25/2025  -     CBC Without Differential; Future; Expected date: 06/25/2025  -     Iron and TIBC; Future; Expected date: 06/25/2025    Class 1 obesity with serious comorbidity and body mass index (BMI) of 32.0 to 32.9 in adult, unspecified obesity type    Myalgia due to statin    Paroxysmal atrial fibrillation        Continue to work on maintain healthy weight, balanced diet. Avoid unhealthy habits: smoking/vaping, excessive alcohol intake.     Recommend diet exercise:  High protein, low fat, low carb diet - calories women under <1200 & men <1800, carbs <100 G, protein 50-60 G daily. Protein supplements to replace one meal.  Keep all beverages < 10 calories per serving. Keep snacks < 100 calories.   Recommend exercise 160 minutes per week, combo of cardio and weight/strength training.     Visit today included increased complexity associated with the care of the episodic problem addressed and managing the longitudinal care of the patient due to the serious and/or complex managed problem(s). HTN      Disclaimer: This note was partly generated using dictation software which may occasionally result in transcription errors  ____________________________________________________________________________________________________  Review of Systems:  Review of Systems      Trigger finger    Objective:     Wt Readings from Last 3 Encounters:   06/25/25 81.5 kg (179 lb 10.8 oz)   12/26/24 82.3 kg (181 lb 8.8 oz)   08/29/24 84.6 kg (186 lb 8.2 oz)     BP Readings from Last 3 Encounters:   06/25/25 118/84   12/26/24 102/68   06/25/24 (!) 154/62       Lab Results   Component Value Date    WBC 11.04 02/21/2025    HGB 9.1 (L) 02/21/2025    HCT 29.1 (L) 02/21/2025     02/21/2025     06/09/2025    K 5.2 (H) 06/09/2025     06/09/2025    ALT 47 (H) 06/09/2025    AST 41 06/09/2025    CO2 21 (L) 06/09/2025    CREATININE  "0.9 06/09/2025    BUN 22 06/09/2025     (H) 06/09/2025      Hemoglobin A1C   Date Value Ref Range Status   12/19/2024 6.8 (H) 4.0 - 5.6 % Final     Comment:     ADA Screening Guidelines:  5.7-6.4%  Consistent with prediabetes  >or=6.5%  Consistent with diabetes    High levels of fetal hemoglobin interfere with the HbA1C  assay. Heterozygous hemoglobin variants (HbS, HgC, etc)do  not significantly interfere with this assay.   However, presence of multiple variants may affect accuracy.     06/18/2024 7.0 (H) 4.0 - 5.6 % Final     Comment:     ADA Screening Guidelines:  5.7-6.4%  Consistent with prediabetes  >or=6.5%  Consistent with diabetes    High levels of fetal hemoglobin interfere with the HbA1C  assay. Heterozygous hemoglobin variants (HbS, HgC, etc)do  not significantly interfere with this assay.   However, presence of multiple variants may affect accuracy.     12/28/2023 7.2 (H) 4.0 - 5.6 % Final     Comment:     ADA Screening Guidelines:  5.7-6.4%  Consistent with prediabetes  >or=6.5%  Consistent with diabetes    High levels of fetal hemoglobin interfere with the HbA1C  assay. Heterozygous hemoglobin variants (HbS, HgC, etc)do  not significantly interfere with this assay.   However, presence of multiple variants may affect accuracy.       Hemoglobin A1c   Date Value Ref Range Status   06/09/2025 7.8 (H) 4.0 - 5.6 % Final     Comment:     ADA Screening Guidelines:  5.7-6.4%  Consistent with prediabetes  >=6.5%  Consistent with diabetes    High levels of fetal hemoglobin interfere with the HbA1C  assay. Heterozygous hemoglobin variants (HbS, HgC, etc)do  not significantly interfere with this assay.   However, presence of multiple variants may affect accuracy.      Lab Results   Component Value Date    TSH 2.567 12/19/2024    TSH 1.66 05/25/2022     No results found for: "FREET4"  Lab Results   Component Value Date    LDLCALC 95.8 06/09/2025    LDLCALC 82.2 12/19/2024    LDLCALC 87.8 06/18/2024     Lab " Results   Component Value Date    TRIG 111 06/09/2025    TRIG 124 12/19/2024    TRIG 161 (H) 06/18/2024            Physical Exam      Constitutional:       Appearance: Normal appearance.   HENT:      Head: Normocephalic and atraumatic.   Eyes:      Extraocular Movements: Extraocular movements intact.      Pupils: Pupils are equal, round, and reactive to light.   Cardiovascular:      Rate and Rhythm: Normal rate.   Pulmonary:      Effort: Pulmonary effort is normal.   Neurological:      Mental Status: She is alert and oriented to person, place, and time.   Psychiatric:         Mood and Affect: Mood normal.     Medication List with Changes/Refills   New Medications    GLIMEPIRIDE (AMARYL) 1 MG TABLET    Take 1 tablet (1 mg total) by mouth before breakfast.    METFORMIN (GLUCOPHAGE-XR) 500 MG ER 24HR TABLET    Take 1 tablet (500 mg total) by mouth 2 (two) times daily with meals.   Current Medications    AMOXICILLIN (AMOXIL) 500 MG CAPSULE    amoxicillin 500 mg capsule   TAKE FOUR CAPSULES BY MOUTH 30 60 MINUTES BEFORE DENTAL WORK    CARBOXYMETHYLCELLULOSE (REFRESH PLUS) 0.5 % DPET    1 drop 3 (three) times daily as needed.    CLOPIDOGREL (PLAVIX) 75 MG TABLET    Take 75 mg by mouth once daily.    COENZYME Q10 200 MG CAPSULE    Take 200 mg by mouth once daily.    GLUCOSAMINE/CHONDR FULLER A SOD (OSTEO BI-FLEX ORAL)    Take 2 capsules by mouth once daily.    KETOCONAZOLE (NIZORAL) 2 % CREAM    Apply twice a day to affected areas of skin    LATANOPROST 0.005 % OPHTHALMIC SOLUTION    Place 1 drop into both eyes every evening.    MULTIVITAMIN WITH MINERALS TABLET    Take 1 tablet by mouth once daily.    PANTOPRAZOLE (PROTONIX) 40 MG TABLET    Take 40 mg by mouth once daily.    ROSUVASTATIN (CRESTOR) 5 MG TABLET    Take 1 tablet (5 mg total) by mouth once daily.    SOTALOL (BETAPACE) 80 MG TABLET    Take 80 mg by mouth 2 (two) times daily.    TRIAMCINOLONE ACETONIDE 0.1% (KENALOG) 0.1 % CREAM    Apply topically 2 (two) times  daily. for 7 days    TURMERIC (CURCUMIN MISC)    1 capsule by Misc.(Non-Drug; Combo Route) route.   Changed and/or Refilled Medications    Modified Medication Previous Medication    LOSARTAN (COZAAR) 25 MG TABLET losartan (COZAAR) 25 MG tablet       Take 1 tablet (25 mg total) by mouth once daily.    Take 1 tablet by mouth once daily   Discontinued Medications    ELIQUIS 5 MG TAB    Take 5 mg by mouth 2 (two) times daily.    FARXIGA 5 MG TAB TABLET    Take 1 tablet (5 mg total) by mouth once daily.    FISH,BORA,FLAX OILS-OM3,6,9 #1 1,200 MG CAP    Take by mouth. 3 capsules daily    METFORMIN (GLUCOPHAGE) 500 MG TABLET    Take 500 mg by mouth. Take 2 tablets in the morning    METFORMIN (GLUCOPHAGE-XR) 500 MG ER 24HR TABLET    Take 3 tablets by mouth once daily    SOTALOL (BETAPACE) 80 MG TABLET    Take 120 mg by mouth 2 (two) times daily.

## 2025-06-25 NOTE — PATIENT INSTRUCTIONS
Counseling and Referral of Other Preventative  (Italic type indicates deductible and co-insurance are waived)    No orders of the defined types were placed in this encounter.     The following information is provided to all patients.  This information is to help you find resources for any of the problems found today that may be affecting your health:                Living healthy guide: www.[facility].[place].[facility]       Understanding Diabetes: www.diabetes.org       Eating healthy: www.cdc.gov/healthyweight      CDC home safety checklist: www.cdc.gov/steadi/patient.html      Agency on Aging: www.goea.[place].[facility]       Alcoholics anonymous (AA): www.aa.org      Physical Activity: www.sol.nih.gov/ug1jdma       Tobacco use: www.quitwithusla.org

## 2025-06-25 NOTE — TELEPHONE ENCOUNTER
Spoke w/ pt. She states [facility] will not take the printed order that Dr Gunter gave her for the US, that we have to fax it to them, and then they would call pt to sched.    US order faxed to [facility], [phone]

## 2025-06-26 ENCOUNTER — RESULTS FOLLOW-UP (OUTPATIENT)
Dept: FAMILY MEDICINE | Facility: CLINIC | Age: 85
End: 2025-06-26
Payer: MEDICARE

## 2025-06-26 DIAGNOSIS — D64.9 ANEMIA, UNSPECIFIED TYPE: Primary | ICD-10-CM

## 2025-06-30 ENCOUNTER — TELEPHONE (OUTPATIENT)
Dept: FAMILY MEDICINE | Facility: CLINIC | Age: 85
End: 2025-06-30
Payer: MEDICARE

## 2025-06-30 NOTE — TELEPHONE ENCOUNTER
----- Message from Willis Womack MD sent at 6/26/2025  4:47 PM CDT -----  Let her know iron and CBC have improved she is no longer anemic.    We can monitor levels with future labs in 6 months  ----- Message -----  From: Lab, Background User  Sent: 6/25/2025   9:31 PM CDT  To: Willis Womack MD

## 2025-06-30 NOTE — TELEPHONE ENCOUNTER
Return call to patient to state her iron levels have improved. Patient states she will increase in vitamins, she was concern about her hemoglobin levels.

## 2025-07-15 DIAGNOSIS — E78.2 MIXED HYPERLIPIDEMIA: ICD-10-CM

## 2025-07-15 RX ORDER — ROSUVASTATIN CALCIUM 5 MG/1
5 TABLET, COATED ORAL
Qty: 90 TABLET | Refills: 3 | Status: SHIPPED | OUTPATIENT
Start: 2025-07-15

## 2025-07-15 NOTE — TELEPHONE ENCOUNTER
No care due was identified.  [facility] Embedded Care Due Messages. Reference number: [id number].   7/15/2025 6:53:10 AM CDT

## 2025-07-15 NOTE — TELEPHONE ENCOUNTER
Refill Decision Note   Jacqui Homa  is requesting a refill authorization.  Brief Assessment and Rationale for Refill:  Approve     Medication Therapy Plan:        Comments:     Note composed:5:13 PM 07/15/2025

## 2025-07-21 ENCOUNTER — TELEPHONE (OUTPATIENT)
Dept: FAMILY MEDICINE | Facility: CLINIC | Age: 85
End: 2025-07-21
Payer: MEDICARE

## 2025-08-03 PROBLEM — M65.331 TRIGGER MIDDLE FINGER OF RIGHT HAND: Status: ACTIVE | Noted: 2025-08-03

## 2025-08-03 PROBLEM — M72.0 DUPUYTREN'S FIBROMATOSIS: Status: ACTIVE | Noted: 2025-08-03

## 2025-08-04 ENCOUNTER — TELEPHONE (OUTPATIENT)
Dept: PHARMACY | Facility: CLINIC | Age: 85
End: 2025-08-04
Payer: MEDICARE

## 2025-08-04 NOTE — TELEPHONE ENCOUNTER
[facility]/Population Health Chart Review & Patient Outreach Details For Medication Adherence Project    Reason for Outreach Encounter: 3rd Party payor non-compliance report ([employer], [employer], C, etc)  2.  Patient Outreach Method: Reviewed patient chart   3.   Medication in question:    Diabetes Medications              glimepiride (AMARYL) 1 MG tablet Take 1 tablet (1 mg total) by mouth before breakfast.    metFORMIN (GLUCOPHAGE-XR) 500 MG ER 24hr tablet Take 1 tablet (500 mg total) by mouth 2 (two) times daily with meals.                 amaryl  last filled  6/25 for 90 day supply      4.  Reviewed and or Updates Made To: Patient Chart  5. Outreach Outcomes and/or actions taken: Patient filled medication and is on track to be adherent  Additional Notes:

## 2025-09-04 ENCOUNTER — TELEPHONE (OUTPATIENT)
Dept: PHARMACY | Facility: CLINIC | Age: 85
End: 2025-09-04
Payer: MEDICARE